# Patient Record
Sex: MALE | Race: OTHER | NOT HISPANIC OR LATINO | URBAN - METROPOLITAN AREA
[De-identification: names, ages, dates, MRNs, and addresses within clinical notes are randomized per-mention and may not be internally consistent; named-entity substitution may affect disease eponyms.]

---

## 2017-02-03 RX ORDER — PANTOPRAZOLE SODIUM 20 MG/1
0 TABLET, DELAYED RELEASE ORAL
Qty: 14 | Refills: 0 | COMMUNITY
Start: 2017-02-03

## 2017-02-03 RX ORDER — PANTOPRAZOLE SODIUM 20 MG/1
1 TABLET, DELAYED RELEASE ORAL
Qty: 14 | Refills: 0 | DISCHARGE
Start: 2017-02-03

## 2017-02-14 RX ORDER — RANOLAZINE 500 MG/1
1 TABLET, FILM COATED, EXTENDED RELEASE ORAL
Qty: 0 | Refills: 0 | COMMUNITY
Start: 2017-02-14 | End: 2017-02-21

## 2017-02-14 RX ORDER — RANOLAZINE 500 MG/1
1 TABLET, FILM COATED, EXTENDED RELEASE ORAL
Qty: 0 | Refills: 0 | DISCHARGE
Start: 2017-02-14

## 2017-02-16 ENCOUNTER — INPATIENT (INPATIENT)
Facility: HOSPITAL | Age: 47
LOS: 1 days | Discharge: HOME | End: 2017-02-18
Attending: INTERNAL MEDICINE | Admitting: INTERNAL MEDICINE
Payer: COMMERCIAL

## 2017-02-16 VITALS
HEART RATE: 87 BPM | RESPIRATION RATE: 20 BRPM | OXYGEN SATURATION: 98 % | SYSTOLIC BLOOD PRESSURE: 128 MMHG | TEMPERATURE: 99 F | DIASTOLIC BLOOD PRESSURE: 86 MMHG

## 2017-02-16 DIAGNOSIS — E78.5 HYPERLIPIDEMIA, UNSPECIFIED: ICD-10-CM

## 2017-02-16 DIAGNOSIS — R07.9 CHEST PAIN, UNSPECIFIED: ICD-10-CM

## 2017-02-16 DIAGNOSIS — E03.9 HYPOTHYROIDISM, UNSPECIFIED: ICD-10-CM

## 2017-02-16 DIAGNOSIS — Z29.9 ENCOUNTER FOR PROPHYLACTIC MEASURES, UNSPECIFIED: ICD-10-CM

## 2017-02-16 DIAGNOSIS — I24.9 ACUTE ISCHEMIC HEART DISEASE, UNSPECIFIED: ICD-10-CM

## 2017-02-16 DIAGNOSIS — I10 ESSENTIAL (PRIMARY) HYPERTENSION: ICD-10-CM

## 2017-02-16 LAB
ALBUMIN SERPL ELPH-MCNC: 4.2 G/DL — SIGNIFICANT CHANGE UP (ref 3.3–5)
ALP SERPL-CCNC: 64 U/L — SIGNIFICANT CHANGE UP (ref 40–120)
ALT FLD-CCNC: 25 U/L — SIGNIFICANT CHANGE UP (ref 4–41)
APTT BLD: 32.9 SEC — SIGNIFICANT CHANGE UP (ref 27.5–37.4)
AST SERPL-CCNC: 20 U/L — SIGNIFICANT CHANGE UP (ref 4–40)
BASOPHILS # BLD AUTO: 0.08 K/UL — SIGNIFICANT CHANGE UP (ref 0–0.2)
BASOPHILS NFR BLD AUTO: 0.7 % — SIGNIFICANT CHANGE UP (ref 0–2)
BILIRUB SERPL-MCNC: 0.5 MG/DL — SIGNIFICANT CHANGE UP (ref 0.2–1.2)
BUN SERPL-MCNC: 13 MG/DL — SIGNIFICANT CHANGE UP (ref 7–23)
CALCIUM SERPL-MCNC: 9.2 MG/DL — SIGNIFICANT CHANGE UP (ref 8.4–10.5)
CHLORIDE SERPL-SCNC: 100 MMOL/L — SIGNIFICANT CHANGE UP (ref 98–107)
CK MB BLD-MCNC: 1.4 — SIGNIFICANT CHANGE UP (ref 0–2.5)
CK MB BLD-MCNC: 1.6 — SIGNIFICANT CHANGE UP (ref 0–2.5)
CK MB BLD-MCNC: 2.41 NG/ML — SIGNIFICANT CHANGE UP (ref 1–6.6)
CK MB BLD-MCNC: 2.58 NG/ML — SIGNIFICANT CHANGE UP (ref 1–6.6)
CK SERPL-CCNC: 162 U/L — SIGNIFICANT CHANGE UP (ref 30–200)
CK SERPL-CCNC: 174 U/L — SIGNIFICANT CHANGE UP (ref 30–200)
CO2 SERPL-SCNC: 27 MMOL/L — SIGNIFICANT CHANGE UP (ref 22–31)
CREAT SERPL-MCNC: 0.97 MG/DL — SIGNIFICANT CHANGE UP (ref 0.5–1.3)
EOSINOPHIL # BLD AUTO: 0.99 K/UL — HIGH (ref 0–0.5)
EOSINOPHIL NFR BLD AUTO: 8.5 % — HIGH (ref 0–6)
GLUCOSE SERPL-MCNC: 108 MG/DL — HIGH (ref 70–99)
HCT VFR BLD CALC: 42.3 % — SIGNIFICANT CHANGE UP (ref 39–50)
HGB BLD-MCNC: 14.1 G/DL — SIGNIFICANT CHANGE UP (ref 13–17)
IMM GRANULOCYTES NFR BLD AUTO: 0.3 % — SIGNIFICANT CHANGE UP (ref 0–1.5)
INR BLD: 1.01 — SIGNIFICANT CHANGE UP (ref 0.87–1.18)
LYMPHOCYTES # BLD AUTO: 38.6 % — SIGNIFICANT CHANGE UP (ref 13–44)
LYMPHOCYTES # BLD AUTO: 4.5 K/UL — HIGH (ref 1–3.3)
MCHC RBC-ENTMCNC: 29 PG — SIGNIFICANT CHANGE UP (ref 27–34)
MCHC RBC-ENTMCNC: 33.3 % — SIGNIFICANT CHANGE UP (ref 32–36)
MCV RBC AUTO: 86.9 FL — SIGNIFICANT CHANGE UP (ref 80–100)
MONOCYTES # BLD AUTO: 0.74 K/UL — SIGNIFICANT CHANGE UP (ref 0–0.9)
MONOCYTES NFR BLD AUTO: 6.4 % — SIGNIFICANT CHANGE UP (ref 2–14)
NEUTROPHILS # BLD AUTO: 5.3 K/UL — SIGNIFICANT CHANGE UP (ref 1.8–7.4)
NEUTROPHILS NFR BLD AUTO: 45.5 % — SIGNIFICANT CHANGE UP (ref 43–77)
PLATELET # BLD AUTO: 283 K/UL — SIGNIFICANT CHANGE UP (ref 150–400)
PMV BLD: 10.5 FL — SIGNIFICANT CHANGE UP (ref 7–13)
POTASSIUM SERPL-MCNC: 4.2 MMOL/L — SIGNIFICANT CHANGE UP (ref 3.5–5.3)
POTASSIUM SERPL-SCNC: 4.2 MMOL/L — SIGNIFICANT CHANGE UP (ref 3.5–5.3)
PROT SERPL-MCNC: 7.3 G/DL — SIGNIFICANT CHANGE UP (ref 6–8.3)
PROTHROM AB SERPL-ACNC: 11.5 SEC — SIGNIFICANT CHANGE UP (ref 10–13.1)
RBC # BLD: 4.87 M/UL — SIGNIFICANT CHANGE UP (ref 4.2–5.8)
RBC # FLD: 12.8 % — SIGNIFICANT CHANGE UP (ref 10.3–14.5)
SODIUM SERPL-SCNC: 140 MMOL/L — SIGNIFICANT CHANGE UP (ref 135–145)
TROPONIN T SERPL-MCNC: < 0.06 NG/ML — SIGNIFICANT CHANGE UP (ref 0–0.06)
TROPONIN T SERPL-MCNC: < 0.06 NG/ML — SIGNIFICANT CHANGE UP (ref 0–0.06)
WBC # BLD: 11.65 K/UL — HIGH (ref 3.8–10.5)
WBC # FLD AUTO: 11.65 K/UL — HIGH (ref 3.8–10.5)

## 2017-02-16 PROCEDURE — 71020: CPT | Mod: 26

## 2017-02-16 RX ORDER — ATORVASTATIN CALCIUM 80 MG/1
10 TABLET, FILM COATED ORAL AT BEDTIME
Qty: 0 | Refills: 0 | Status: DISCONTINUED | OUTPATIENT
Start: 2017-02-16 | End: 2017-02-18

## 2017-02-16 RX ORDER — SODIUM CHLORIDE 9 MG/ML
3 INJECTION INTRAMUSCULAR; INTRAVENOUS; SUBCUTANEOUS EVERY 8 HOURS
Qty: 0 | Refills: 0 | Status: DISCONTINUED | OUTPATIENT
Start: 2017-02-16 | End: 2017-02-18

## 2017-02-16 RX ORDER — ASPIRIN/CALCIUM CARB/MAGNESIUM 324 MG
325 TABLET ORAL ONCE
Qty: 0 | Refills: 0 | Status: COMPLETED | OUTPATIENT
Start: 2017-02-16 | End: 2017-02-16

## 2017-02-16 RX ORDER — TICAGRELOR 90 MG/1
180 TABLET ORAL ONCE
Qty: 0 | Refills: 0 | Status: COMPLETED | OUTPATIENT
Start: 2017-02-16 | End: 2017-02-16

## 2017-02-16 RX ORDER — RANOLAZINE 500 MG/1
500 TABLET, FILM COATED, EXTENDED RELEASE ORAL
Qty: 0 | Refills: 0 | Status: DISCONTINUED | OUTPATIENT
Start: 2017-02-16 | End: 2017-02-18

## 2017-02-16 RX ORDER — PANTOPRAZOLE SODIUM 20 MG/1
40 TABLET, DELAYED RELEASE ORAL
Qty: 0 | Refills: 0 | Status: DISCONTINUED | OUTPATIENT
Start: 2017-02-16 | End: 2017-02-18

## 2017-02-16 RX ORDER — ASPIRIN/CALCIUM CARB/MAGNESIUM 324 MG
81 TABLET ORAL DAILY
Qty: 0 | Refills: 0 | Status: DISCONTINUED | OUTPATIENT
Start: 2017-02-17 | End: 2017-02-18

## 2017-02-16 RX ORDER — LOSARTAN POTASSIUM 100 MG/1
50 TABLET, FILM COATED ORAL DAILY
Qty: 0 | Refills: 0 | Status: DISCONTINUED | OUTPATIENT
Start: 2017-02-16 | End: 2017-02-18

## 2017-02-16 RX ORDER — LEVOTHYROXINE SODIUM 125 MCG
25 TABLET ORAL DAILY
Qty: 0 | Refills: 0 | Status: DISCONTINUED | OUTPATIENT
Start: 2017-02-16 | End: 2017-02-18

## 2017-02-16 RX ORDER — METOPROLOL TARTRATE 50 MG
50 TABLET ORAL DAILY
Qty: 0 | Refills: 0 | Status: DISCONTINUED | OUTPATIENT
Start: 2017-02-16 | End: 2017-02-18

## 2017-02-16 RX ORDER — HEPARIN SODIUM 5000 [USP'U]/ML
5000 INJECTION INTRAVENOUS; SUBCUTANEOUS EVERY 8 HOURS
Qty: 0 | Refills: 0 | Status: DISCONTINUED | OUTPATIENT
Start: 2017-02-16 | End: 2017-02-18

## 2017-02-16 RX ADMIN — Medication 325 MILLIGRAM(S): at 19:20

## 2017-02-16 RX ADMIN — TICAGRELOR 180 MILLIGRAM(S): 90 TABLET ORAL at 19:20

## 2017-02-16 RX ADMIN — HEPARIN SODIUM 5000 UNIT(S): 5000 INJECTION INTRAVENOUS; SUBCUTANEOUS at 22:34

## 2017-02-16 RX ADMIN — RANOLAZINE 500 MILLIGRAM(S): 500 TABLET, FILM COATED, EXTENDED RELEASE ORAL at 22:34

## 2017-02-16 RX ADMIN — PANTOPRAZOLE SODIUM 40 MILLIGRAM(S): 20 TABLET, DELAYED RELEASE ORAL at 22:34

## 2017-02-16 RX ADMIN — SODIUM CHLORIDE 3 MILLILITER(S): 9 INJECTION INTRAMUSCULAR; INTRAVENOUS; SUBCUTANEOUS at 22:34

## 2017-02-16 RX ADMIN — LOSARTAN POTASSIUM 50 MILLIGRAM(S): 100 TABLET, FILM COATED ORAL at 22:34

## 2017-02-16 RX ADMIN — Medication 25 MICROGRAM(S): at 22:34

## 2017-02-16 RX ADMIN — ATORVASTATIN CALCIUM 10 MILLIGRAM(S): 80 TABLET, FILM COATED ORAL at 22:34

## 2017-02-16 NOTE — H&P ADULT. - ATTENDING COMMENTS
pt seen and examined at bedside. Agree with assessment and plan  Admitted with chest pain concerning for UA  Will plan for [facility]  Brilinta 180 mg x1 and ASA

## 2017-02-16 NOTE — ED ADULT NURSE NOTE - ED STAT RN HANDOFF DETAILS
Rpt to HUBERT Kaba - pt aaox4, ambulatory/self care, in NAD, NSR on CM, x2 Salome negative, NPO after midnight for anticipated Card Cath in AM.

## 2017-02-16 NOTE — ED PROVIDER NOTE - MEDICAL DECISION MAKING DETAILS
47 y/o M with h/o hypertension presents with episode of left sided chest pain last night, currently asymptomatic. EKG with no ischemic changes. Heart score 2-low risk. 2 sets of Salome and will follow up with cardiologist tomorrow for possible cath 45 y/o M with h/o hypertension presents with episode of left sided chest pain last night, currently asymptomatic. EKG with no ischemic changes. Heart score 2-low risk. 2 sets of Salome and will follow up with cardiologist tomorrow for possible cath  Attending Note: 45 y/o male presents w/ c/o c/p. S/P stress x 1 day, basic labs, cardiac enzymes, cxr, ekg. Discussed with cardiology. If neg may be d/c and if asymptomatic, pt to see MD in AM for possible cath.

## 2017-02-16 NOTE — H&P ADULT. - HISTORY OF PRESENT ILLNESS
Hamida speaking and the history provided by the daughter at the pt's request.    46M with a history of HTN, Hypothyroid, GERD, Dyslipidemia, experienced exertional, L sided chest squeezing sensation that radiates to his L side back. The chest pain is intermittent with episodes lasting for 10 minutes, subsiding on their own and occurring 3 to 4x's a day. Positive SOB. No cough, nausea, vomit, diaphoresis, chills.    In the Ed, the pt had Ce negative x 2, EKG NSR.

## 2017-02-16 NOTE — H&P ADULT. - NEGATIVE NEUROLOGICAL SYMPTOMS
no tremors/no difficulty walking/no loss of sensation/no syncope/no vertigo/no generalized seizures/no paresthesias/no focal seizures/no weakness

## 2017-02-16 NOTE — H&P ADULT. - RS GEN PE MLT RESP DETAILS PC
clear to auscultation bilaterally/good air movement/breath sounds equal/respirations non-labored/airway patent

## 2017-02-16 NOTE — ED PROVIDER NOTE - PROGRESS NOTE DETAILS
Patient's family at bedside and state that patient does not want to be further managed by Dr. Mello and would like to be admitted to hospital for further evaluation. Spoke with Dr. Carbone who is tele doc of the day. He was notified that patient was previously taken care of by Dr. Mello but family wishes to have second opinion. Patient will likely have cath tomorrow.

## 2017-02-16 NOTE — ED PROVIDER NOTE - OBJECTIVE STATEMENT
47 y/o M with h/o HTN, hypothyroid presents with complaint of episode of left sided chest pain at 3 AM last night while patient was working. Patient reports recurrent episodes of similar chest pain for which he is being evaluated by Dr. Mello. Had stress and echo done 2 days ago. Denies any SOB, SEGURA, orthopnea, LE swelling. Currently patient is not having any active chest pain. No family history of MI.

## 2017-02-16 NOTE — ED ADULT NURSE NOTE - OBJECTIVE STATEMENT
Pt a&ox3, aminata speaking, c/o intermittent cp starting around 400am this morning, nonreproducible. Pt denies sob breathing even and unlabored resp. NSR on monitor, denies any present cp/discomfort, denies headache/dizziness. Abdomen is soft, non-tender, non-distended. Skin is cool dry and intact. IV lock placed, labs drawn and sent as ordered. Will continue to monitor.

## 2017-02-16 NOTE — ED PROVIDER NOTE - PHYSICAL EXAMINATION
Attending Note: 45 y/o male left sided c/p x 0400. PMH HTN, hypothyroid. Reports recurrent episodes of similar c/p being evaluated by Dr. Mello. Had stress and echo x 2 days ago. Denies SOB, SEGURA, PND, diaphoresis, N/V, orthopnea, LE swelling, recent travel, trauma. Patient is asymptomatic at present. No family history of MI.

## 2017-02-17 LAB
BUN SERPL-MCNC: 10 MG/DL — SIGNIFICANT CHANGE UP (ref 7–23)
CALCIUM SERPL-MCNC: 9.1 MG/DL — SIGNIFICANT CHANGE UP (ref 8.4–10.5)
CHLORIDE SERPL-SCNC: 105 MMOL/L — SIGNIFICANT CHANGE UP (ref 98–107)
CO2 SERPL-SCNC: 22 MMOL/L — SIGNIFICANT CHANGE UP (ref 22–31)
CREAT SERPL-MCNC: 0.99 MG/DL — SIGNIFICANT CHANGE UP (ref 0.5–1.3)
GLUCOSE SERPL-MCNC: 107 MG/DL — HIGH (ref 70–99)
HCT VFR BLD CALC: 42.4 % — SIGNIFICANT CHANGE UP (ref 39–50)
HGB BLD-MCNC: 13.9 G/DL — SIGNIFICANT CHANGE UP (ref 13–17)
MAGNESIUM SERPL-MCNC: 2.2 MG/DL — SIGNIFICANT CHANGE UP (ref 1.6–2.6)
MCHC RBC-ENTMCNC: 28.3 PG — SIGNIFICANT CHANGE UP (ref 27–34)
MCHC RBC-ENTMCNC: 32.8 % — SIGNIFICANT CHANGE UP (ref 32–36)
MCV RBC AUTO: 86.4 FL — SIGNIFICANT CHANGE UP (ref 80–100)
PHOSPHATE SERPL-MCNC: 4 MG/DL — SIGNIFICANT CHANGE UP (ref 2.5–4.5)
PLATELET # BLD AUTO: 289 K/UL — SIGNIFICANT CHANGE UP (ref 150–400)
PMV BLD: 10.2 FL — SIGNIFICANT CHANGE UP (ref 7–13)
POTASSIUM SERPL-MCNC: 4.1 MMOL/L — SIGNIFICANT CHANGE UP (ref 3.5–5.3)
POTASSIUM SERPL-SCNC: 4.1 MMOL/L — SIGNIFICANT CHANGE UP (ref 3.5–5.3)
RBC # BLD: 4.91 M/UL — SIGNIFICANT CHANGE UP (ref 4.2–5.8)
RBC # FLD: 13.1 % — SIGNIFICANT CHANGE UP (ref 10.3–14.5)
SODIUM SERPL-SCNC: 142 MMOL/L — SIGNIFICANT CHANGE UP (ref 135–145)
TSH SERPL-MCNC: 6.03 UIU/ML — HIGH (ref 0.27–4.2)
WBC # BLD: 13.2 K/UL — HIGH (ref 3.8–10.5)
WBC # FLD AUTO: 13.2 K/UL — HIGH (ref 3.8–10.5)

## 2017-02-17 PROCEDURE — 93458 L HRT ARTERY/VENTRICLE ANGIO: CPT | Mod: 26

## 2017-02-17 RX ORDER — SODIUM CHLORIDE 9 MG/ML
1000 INJECTION INTRAMUSCULAR; INTRAVENOUS; SUBCUTANEOUS
Qty: 0 | Refills: 0 | Status: DISCONTINUED | OUTPATIENT
Start: 2017-02-17 | End: 2017-02-18

## 2017-02-17 RX ADMIN — Medication 81 MILLIGRAM(S): at 12:23

## 2017-02-17 RX ADMIN — SODIUM CHLORIDE 150 MILLILITER(S): 9 INJECTION INTRAMUSCULAR; INTRAVENOUS; SUBCUTANEOUS at 20:52

## 2017-02-17 RX ADMIN — LOSARTAN POTASSIUM 50 MILLIGRAM(S): 100 TABLET, FILM COATED ORAL at 05:47

## 2017-02-17 RX ADMIN — SODIUM CHLORIDE 3 MILLILITER(S): 9 INJECTION INTRAMUSCULAR; INTRAVENOUS; SUBCUTANEOUS at 14:48

## 2017-02-17 RX ADMIN — ATORVASTATIN CALCIUM 10 MILLIGRAM(S): 80 TABLET, FILM COATED ORAL at 22:03

## 2017-02-17 RX ADMIN — HEPARIN SODIUM 5000 UNIT(S): 5000 INJECTION INTRAVENOUS; SUBCUTANEOUS at 22:03

## 2017-02-17 RX ADMIN — SODIUM CHLORIDE 3 MILLILITER(S): 9 INJECTION INTRAMUSCULAR; INTRAVENOUS; SUBCUTANEOUS at 21:52

## 2017-02-17 RX ADMIN — RANOLAZINE 500 MILLIGRAM(S): 500 TABLET, FILM COATED, EXTENDED RELEASE ORAL at 05:47

## 2017-02-17 RX ADMIN — SODIUM CHLORIDE 3 MILLILITER(S): 9 INJECTION INTRAMUSCULAR; INTRAVENOUS; SUBCUTANEOUS at 05:47

## 2017-02-17 RX ADMIN — PANTOPRAZOLE SODIUM 40 MILLIGRAM(S): 20 TABLET, DELAYED RELEASE ORAL at 05:47

## 2017-02-17 RX ADMIN — Medication 25 MICROGRAM(S): at 05:47

## 2017-02-17 RX ADMIN — HEPARIN SODIUM 5000 UNIT(S): 5000 INJECTION INTRAVENOUS; SUBCUTANEOUS at 05:47

## 2017-02-17 RX ADMIN — Medication 50 MILLIGRAM(S): at 05:47

## 2017-02-18 VITALS — SYSTOLIC BLOOD PRESSURE: 106 MMHG | HEART RATE: 75 BPM | DIASTOLIC BLOOD PRESSURE: 65 MMHG

## 2017-02-18 LAB
BUN SERPL-MCNC: 10 MG/DL — SIGNIFICANT CHANGE UP (ref 7–23)
CALCIUM SERPL-MCNC: 8.6 MG/DL — SIGNIFICANT CHANGE UP (ref 8.4–10.5)
CHLORIDE SERPL-SCNC: 105 MMOL/L — SIGNIFICANT CHANGE UP (ref 98–107)
CO2 SERPL-SCNC: 22 MMOL/L — SIGNIFICANT CHANGE UP (ref 22–31)
CREAT SERPL-MCNC: 0.95 MG/DL — SIGNIFICANT CHANGE UP (ref 0.5–1.3)
GLUCOSE SERPL-MCNC: 91 MG/DL — SIGNIFICANT CHANGE UP (ref 70–99)
HCT VFR BLD CALC: 41.2 % — SIGNIFICANT CHANGE UP (ref 39–50)
HGB BLD-MCNC: 13.5 G/DL — SIGNIFICANT CHANGE UP (ref 13–17)
MAGNESIUM SERPL-MCNC: 2.1 MG/DL — SIGNIFICANT CHANGE UP (ref 1.6–2.6)
MCHC RBC-ENTMCNC: 28.5 PG — SIGNIFICANT CHANGE UP (ref 27–34)
MCHC RBC-ENTMCNC: 32.8 % — SIGNIFICANT CHANGE UP (ref 32–36)
MCV RBC AUTO: 86.9 FL — SIGNIFICANT CHANGE UP (ref 80–100)
PLATELET # BLD AUTO: 270 K/UL — SIGNIFICANT CHANGE UP (ref 150–400)
PMV BLD: 10.8 FL — SIGNIFICANT CHANGE UP (ref 7–13)
POTASSIUM SERPL-MCNC: 4.1 MMOL/L — SIGNIFICANT CHANGE UP (ref 3.5–5.3)
POTASSIUM SERPL-SCNC: 4.1 MMOL/L — SIGNIFICANT CHANGE UP (ref 3.5–5.3)
RBC # BLD: 4.74 M/UL — SIGNIFICANT CHANGE UP (ref 4.2–5.8)
RBC # FLD: 13.1 % — SIGNIFICANT CHANGE UP (ref 10.3–14.5)
SODIUM SERPL-SCNC: 142 MMOL/L — SIGNIFICANT CHANGE UP (ref 135–145)
WBC # BLD: 13.17 K/UL — HIGH (ref 3.8–10.5)
WBC # FLD AUTO: 13.17 K/UL — HIGH (ref 3.8–10.5)

## 2017-02-18 RX ADMIN — Medication 50 MILLIGRAM(S): at 05:54

## 2017-02-18 RX ADMIN — Medication 81 MILLIGRAM(S): at 12:02

## 2017-02-18 RX ADMIN — SODIUM CHLORIDE 3 MILLILITER(S): 9 INJECTION INTRAMUSCULAR; INTRAVENOUS; SUBCUTANEOUS at 05:21

## 2017-02-18 RX ADMIN — LOSARTAN POTASSIUM 50 MILLIGRAM(S): 100 TABLET, FILM COATED ORAL at 05:54

## 2017-02-18 RX ADMIN — Medication 25 MICROGRAM(S): at 05:20

## 2017-02-18 RX ADMIN — PANTOPRAZOLE SODIUM 40 MILLIGRAM(S): 20 TABLET, DELAYED RELEASE ORAL at 05:21

## 2017-02-18 RX ADMIN — RANOLAZINE 500 MILLIGRAM(S): 500 TABLET, FILM COATED, EXTENDED RELEASE ORAL at 05:21

## 2017-02-18 RX ADMIN — HEPARIN SODIUM 5000 UNIT(S): 5000 INJECTION INTRAVENOUS; SUBCUTANEOUS at 05:20

## 2017-02-18 NOTE — DISCHARGE NOTE ADULT - NS AS ACTIVITY OBS
No heavy lifting greater than 5-10 pounds with right wrist x one week. No strenuous activity x 3 weeks. Do not drive or operate machinery/Walking-Indoors allowed/Walking-Outdoors allowed/Showering allowed/No heavy lifting greater than 5-10 pounds with right wrist x one week. No strenuous activity x 3 weeks./No Heavy lifting/straining

## 2017-02-18 NOTE — DISCHARGE NOTE ADULT - MEDICATION SUMMARY - MEDICATIONS TO TAKE
I will START or STAY ON the medications listed below when I get home from the hospital:    aspirin 81 mg oral delayed release tablet  -- 1 tab(s) by mouth once a day  -- Indication: For Non-obstructive CAD (coronary artery disease)    losartan 50 mg oral tablet  -- 1 tab(s) by mouth once a day  -- Indication: For HTN (hypertension)    Ranexa 500 mg oral tablet, extended release  -- 1 tab(s) by mouth 2 times a day  -- Indication: For Chest pain    pravastatin 20 mg oral tablet  -- 1 tab(s) by mouth once a day  -- Indication: For HLD (hyperlipidemia)    metoprolol succinate 50 mg oral tablet, extended release  -- 1 tab(s) by mouth once a day  -- Indication: For HTN (hypertension)    dicyclomine 20 mg oral tablet  -- 1 tab(s) by mouth 3 times a day, As Needed  -- Indication: For Bowel    pantoprazole 20 mg oral delayed release tablet  -- 1 tab(s) by mouth once a day, for 14 days  -- Indication: For GERD    levothyroxine 25 mcg (0.025 mg) oral tablet  -- 1 tab(s) by mouth once a day  -- Indication: For Hypothyroidism, unspecified type

## 2017-02-18 NOTE — DISCHARGE NOTE ADULT - PATIENT PORTAL LINK FT
“You can access the [employer] Patient Portal, offered by [facility], by registering with the following website: [email or web address]”

## 2017-02-18 NOTE — DISCHARGE NOTE ADULT - PLAN OF CARE
Remain chest pain free Follow up with your cardiologist  You had a cardiac cath therefore no heavy lifting greater than 5-10 pounds with right wrist x one week. No strenuous activity x 3 weeks. Monitor site of procedure and notify your doctor for any redness, swelling, discharge. Maintain normal blood pressure to prevent heart attack, stroke and renal failure Home blood pressure monitoring  Low sodium diet  Follow up with primary care physician   Continue blood pressure medications Maintain normal cholesterol levels to prevent stroke and heart attack Low fat diet  Continue current medications  Follow up with primary care physician and cardiologist To maintain a normal TSH Level Continue to take your thyroid medication as prescribed  Follow up with your primary care provider for routine thyroid level checks Maintain normal bowel function Continue your bentyl as instructed by your doctor Remain chest pain free. Prevent coronary artery disease. Follow up with cardiologist within one week of discharge. Call for appointment. Return to ED for any concerning symptoms. Continue medications as prescribed. Low salt, low fat, low cholesterol diet. You had a cardiac cath therefore no heavy lifting greater than 5-10 pounds with right wrist x one week. No strenuous activity x 3 weeks. Monitor site of procedure and notify your doctor for any redness, swelling, discharge. Maintain adequate control of your blood pressure. Goal BP < 130/80. Continue low sodium diet. Follow up with PCP and/or cardiologist for ongoing medical management of your hypertension. Continue medications as prescribed. Low salt diet. Maintain adequate control of your cholesterol levels. Goal LDL < 70. Follow up with PCP for ongoing medical management. Continue medications as prescribed. Low cholesterol diet. To maintain a normal TSH Level. Continue to take your thyroid medication as prescribed. Follow up with your primary care provider for routine thyroid level checks.

## 2017-02-18 NOTE — DISCHARGE NOTE ADULT - SECONDARY DIAGNOSIS.
Essential hypertension Hyperlipidemia, unspecified hyperlipidemia type Hypothyroidism, unspecified type IBS (irritable bowel syndrome) HTN (hypertension) HLD (hyperlipidemia) Hypothyroid

## 2017-02-18 NOTE — DISCHARGE NOTE ADULT - CARE PROVIDER_API CALL
Daljit Duncan), Family Medicine  [address] [place] [address]  Phone: [phone]  Fax: [phone] Daljit Duncan), Family Medicine  [address] [place] [address]  Phone: [phone]  Fax: [phone]    Nixon Carbone (), Cardiology; Internal Medicine  [address] [place] [address]  Phone: [phone]  Fax: [phone]

## 2017-02-18 NOTE — DISCHARGE NOTE ADULT - CARE PLAN
Principal Discharge DX:	Chest pain, unspecified type  Goal:	Remain chest pain free  Instructions for follow-up, activity and diet:	Follow up with your cardiologist  You had a cardiac cath therefore no heavy lifting greater than 5-10 pounds with right wrist x one week. No strenuous activity x 3 weeks. Monitor site of procedure and notify your doctor for any redness, swelling, discharge.  Secondary Diagnosis:	Essential hypertension  Goal:	Maintain normal blood pressure to prevent heart attack, stroke and renal failure  Instructions for follow-up, activity and diet:	Home blood pressure monitoring  Low sodium diet  Follow up with primary care physician   Continue blood pressure medications  Secondary Diagnosis:	Hyperlipidemia, unspecified hyperlipidemia type  Goal:	Maintain normal cholesterol levels to prevent stroke and heart attack  Instructions for follow-up, activity and diet:	Low fat diet  Continue current medications  Follow up with primary care physician and cardiologist  Secondary Diagnosis:	Hypothyroidism, unspecified type  Goal:	To maintain a normal TSH Level  Instructions for follow-up, activity and diet:	Continue to take your thyroid medication as prescribed  Follow up with your primary care provider for routine thyroid level checks  Secondary Diagnosis:	IBS (irritable bowel syndrome)  Goal:	Maintain normal bowel function  Instructions for follow-up, activity and diet:	Continue your bentyl as instructed by your doctor Principal Discharge DX:	Chest pain, unspecified type  Goal:	Remain chest pain free. Prevent coronary artery disease.  Instructions for follow-up, activity and diet:	Follow up with cardiologist within one week of discharge. Call for appointment. Return to ED for any concerning symptoms. Continue medications as prescribed. Low salt, low fat, low cholesterol diet. You had a cardiac cath therefore no heavy lifting greater than 5-10 pounds with right wrist x one week. No strenuous activity x 3 weeks. Monitor site of procedure and notify your doctor for any redness, swelling, discharge.  Secondary Diagnosis:	HTN (hypertension)  Goal:	Maintain adequate control of your blood pressure. Goal BP < 130/80. Continue low sodium diet.  Instructions for follow-up, activity and diet:	Follow up with PCP and/or cardiologist for ongoing medical management of your hypertension. Continue medications as prescribed. Low salt diet.  Secondary Diagnosis:	HLD (hyperlipidemia)  Goal:	Maintain adequate control of your cholesterol levels. Goal LDL < 70.  Instructions for follow-up, activity and diet:	Follow up with PCP for ongoing medical management. Continue medications as prescribed. Low cholesterol diet.  Secondary Diagnosis:	Hypothyroid  Goal:	To maintain a normal TSH Level.  Instructions for follow-up, activity and diet:	Continue to take your thyroid medication as prescribed. Follow up with your primary care provider for routine thyroid level checks.

## 2017-02-18 NOTE — DISCHARGE NOTE ADULT - HOSPITAL COURSE
45 y/o male with a hx of HTN, HLD, hypothyroidism, with c/o chest pain x 3 days admitted to telemetry to r/o ACS. Pt had 2 negative troponins and CXR that was unremarkable. Pt had a cardiac cath inpatient that revealed LM spasm, diffuse luminal irregularities, EF 65% and LVEDP 14. Pt remained chest pain free and hemodynamically stable throughout admission. 47 y/o male with a PMHx of HTN, HLD, and hypothyroidism presented to ED complaining of chest pain. Upon arrival to ED, EKG revealed NSR at 87 bpm. Pt ruled out for ACS with two sets of negative cardiac enzymes. CXR revealed, "Clear lungs. No pleural effusions or pneumothorax. Cardiac and mediastinal silhouettes within normal limits. Trachea midline. Unremarkable osseous structures." Pt was admitted to telemetry. Pt had a cardiac cath inpatient that revealed LM spasm, diffuse luminal irregularities with EF 65% and LVEDP 14. Pt remained chest pain free and hemodynamically stable throughout admission. Case discussed with Dr. Carbone on 2/18. Pt now stable for discharge home.

## 2018-02-22 ENCOUNTER — EMERGENCY (EMERGENCY)
Facility: HOSPITAL | Age: 48
LOS: 1 days | Discharge: HOME | End: 2018-02-22
Attending: EMERGENCY MEDICINE | Admitting: EMERGENCY MEDICINE
Payer: COMMERCIAL

## 2018-02-22 VITALS
RESPIRATION RATE: 16 BRPM | DIASTOLIC BLOOD PRESSURE: 69 MMHG | TEMPERATURE: 97 F | HEART RATE: 64 BPM | SYSTOLIC BLOOD PRESSURE: 109 MMHG | OXYGEN SATURATION: 99 %

## 2018-02-22 PROBLEM — I10 ESSENTIAL (PRIMARY) HYPERTENSION: Chronic | Status: ACTIVE | Noted: 2017-02-16

## 2018-02-22 PROBLEM — E03.9 HYPOTHYROIDISM, UNSPECIFIED: Chronic | Status: ACTIVE | Noted: 2017-02-16

## 2018-02-22 PROCEDURE — 99283 EMERGENCY DEPT VISIT LOW MDM: CPT | Mod: 25

## 2018-02-22 RX ORDER — DIAZEPAM 5 MG
1 TABLET ORAL
Qty: 6 | Refills: 0
Start: 2018-02-22 | End: 2018-02-23

## 2018-02-22 RX ORDER — LIDOCAINE 4 G/100G
1 CREAM TOPICAL ONCE
Qty: 0 | Refills: 0 | Status: COMPLETED | OUTPATIENT
Start: 2018-02-22 | End: 2018-02-22

## 2018-02-22 RX ORDER — ACETAMINOPHEN 500 MG
650 TABLET ORAL ONCE
Qty: 0 | Refills: 0 | Status: DISCONTINUED | OUTPATIENT
Start: 2018-02-22 | End: 2018-02-22

## 2018-02-22 RX ORDER — KETOROLAC TROMETHAMINE 30 MG/ML
30 SYRINGE (ML) INJECTION ONCE
Qty: 0 | Refills: 0 | Status: DISCONTINUED | OUTPATIENT
Start: 2018-02-22 | End: 2018-02-22

## 2018-02-22 RX ADMIN — Medication 650 MILLIGRAM(S): at 07:26

## 2018-02-22 RX ADMIN — LIDOCAINE 1 PATCH: 4 CREAM TOPICAL at 07:26

## 2018-02-22 RX ADMIN — Medication 30 MILLIGRAM(S): at 07:26

## 2018-02-22 NOTE — ED PROVIDER NOTE - OBJECTIVE STATEMENT
46 yo M PMHx HTN, HLD, hypothyroidism p/w back pain. 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 Pt is a  and started having sudden onset upper back pain while driving last night around midnight. He took 600 mg Ibuprofen with no relief in his pain. He appears uncomfortable and the pain is worsened with movement. He was brought in by his son and is accompanied by his wife at bedside. He denies CP/palpitations, N/V, abd pain, fevers/chills, urinary changes, IVDU. He denies tobacco use, drinks occasional ETOH, denies drug use.

## 2018-02-22 NOTE — ED PROVIDER NOTE - PHYSICAL EXAMINATION
Gen: AAOx3, non-toxic, appears uncomfortable   Head: NCAT  HEENT: EOMI, oral mucosa moist, normal conjunctiva  Lung: CTAB, no respiratory distress, no wheezes/rhonchi/rales B/L, speaking in full sentences  CV: RRR, no murmurs, rubs or gallops  Abd: soft, NTND, no guarding, no CVA tenderness bilaterally  MSK: no visible deformities, no midline thoracic or lumbar tenderness +R paraspinal tenderness of thoracic region  Neuro: No focal sensory or motor deficits, +5/5 lower ext strength with upgoing Babinski bilaterally, ambulates well  Skin: Warm, well perfused, no rash  Psych: normal affect.   ~Bob Miller M.D. Resident

## 2018-02-22 NOTE — ED PROVIDER NOTE - ATTENDING CONTRIBUTION TO CARE
I was physically present for the E/M service provided. I agree with above history, physical, and plan which I have reviewed and edited where appropriate. I was physically present for the key portions of the service provided.    46 yo M PMHx HTN, HLD, hypothyroidism p/w back pain. Pt is a  and started having sudden onset upper back pain while driving last night around midnight. He took 600 mg Ibuprofen with no relief in his pain.  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  no diaphoresis, no n/v, no headache, no cp, no sob, no radiation, no tearing pain, no cough, no trauma, no durg use.  pt states worse with movement without focal weakness or numbness or tingling.      *GEN:   comfortable, in no apparent distress, AOx3  *EYES:   PERRL, extra-occular movements intact  *HEENT:   airway patent, moist mucosal membranes, uvula midline  *CV:   regular rate and rhythm, normal S1/S2, no murmur  *RESP:   clear to auscultation bilaterally, non-labored, speaking in full sentences  *ABD:   soft, non tender, no guarding  *MSK:   right rhomboid musculoskeletal tenderness pinpoint, 5/5 strength, moving all extremity  *SKIN:   dry, intact, no rash  *NEURO:   AOx3, no focal weakness or loss of sensation, gait normal, GCS 15    pt with msk pain.  will give meds and reassess.  have pt f/u with pcp for physical therapy and please use heat packs to area,  take motrin 600mg every 6 hrs as needed, tylenol 650mg every 4 hrs as needed, stay active, no heavy lifting and return if symptoms worses

## 2018-02-22 NOTE — ED ADULT TRIAGE NOTE - CHIEF COMPLAINT QUOTE
Patient c/o right sided back pain since yesterday. Denies any pain to the area. Pain worse with movement.

## 2018-02-22 NOTE — ED PROVIDER NOTE - MEDICAL DECISION MAKING DETAILS
46 yo M PMHx HTN, HLD, hypothyroidism p/w sudden onset back pain with paraspinal tenderness likely musculoskeletal strain, will provide pain control and reevaluate

## 2018-02-22 NOTE — ED PROVIDER NOTE - PLAN OF CARE
You were seen in the Emergency Department for back pain.   1) Advance activity as tolerated.    2) Continue all previously prescribed medications as directed.  your prescription of Valium and take as directed. DO NOT drive or drink alcohol while taking this medication.   3) Follow up with your primary care physician in 24-48 hours.  4) Return to the Emergency Department for worsening or persistent symptoms, and/or ANY NEW OR CONCERNING SYMPTOMS. If you have issues obtaining follow up, please call: [phone] to obtain a doctor or specialist who takes your insurance in your area.

## 2018-02-22 NOTE — ED PROVIDER NOTE - CARE PLAN
Principal Discharge DX:	Back pain  Assessment and plan of treatment:	You were seen in the Emergency Department for back pain.   1) Advance activity as tolerated.    2) Continue all previously prescribed medications as directed.  your prescription of Valium and take as directed. DO NOT drive or drink alcohol while taking this medication.   3) Follow up with your primary care physician in 24-48 hours.  4) Return to the Emergency Department for worsening or persistent symptoms, and/or ANY NEW OR CONCERNING SYMPTOMS. If you have issues obtaining follow up, please call: [phone] to obtain a doctor or specialist who takes your insurance in your area.

## 2018-02-22 NOTE — ED PROVIDER NOTE - NS ED ROS FT
GENERAL: No fever or chills, EYES: no change in vision, HEENT: no trouble swallowing or speaking, CARDIAC: no chest pain, PULMONARY: no cough or SOB, GI: no abdominal pain, no nausea, no vomiting, no diarrhea or constipation, : No changes in urination, SKIN: no rashes, NEURO: no headache,  MSK: upper back pain ~Bob Miller M.D. Resident

## 2018-03-20 NOTE — PATIENT PROFILE ADULT. - PROVIDER NOTIFICATION
RECEIVED PARTIAL CONSULT LETTER, 4 PAGES OF 8.  CALLED DR. MOLINA'S OFFICE AND REQUESTED A COMPLETE COPY OF THE 3-19-18 PROGRESS NOTE.  WAS TOLD THIS WILL BE ADDRESSED WITHIN 24-48 HOURS. Declines

## 2019-06-20 ENCOUNTER — EMERGENCY (EMERGENCY)
Facility: HOSPITAL | Age: 49
LOS: 1 days | Discharge: HOME | End: 2019-06-20
Admitting: EMERGENCY MEDICINE
Payer: MEDICAID

## 2019-06-20 VITALS
DIASTOLIC BLOOD PRESSURE: 84 MMHG | RESPIRATION RATE: 18 BRPM | TEMPERATURE: 98 F | SYSTOLIC BLOOD PRESSURE: 128 MMHG | HEART RATE: 74 BPM | OXYGEN SATURATION: 98 %

## 2019-06-20 PROCEDURE — 99283 EMERGENCY DEPT VISIT LOW MDM: CPT | Mod: 25

## 2019-06-20 RX ADMIN — Medication 100 MILLIGRAM(S): at 03:55

## 2019-06-20 NOTE — ED PROVIDER NOTE - OBJECTIVE STATEMENT
50 Y/O M PMH HTN presents after noting a bump on his head for the past day. Pt denies trauma, fever, chills, nightsweats or any other symptoms or complaints. Pt states he never noticed the area was inflamed before. Hamida interpretation used to speak with patient.

## 2019-06-20 NOTE — ED ADULT TRIAGE NOTE - CHIEF COMPLAINT QUOTE
pt. w/ hx. HTN c/o small hard bump noticed a few hours ago on top of the head. Pt. denies any recent fall , or hit to the head , pt. denies any pain on the bump. He is just concern , and would like an evaluation on the bump. Bump is noted to be small and intact. No signs of trauma. Pt. appears in NAD in triage.

## 2019-06-20 NOTE — ED PROVIDER NOTE - NSFOLLOWUPINSTRUCTIONS_ED_ALL_ED_FT
Take the antibiotic two times per day for 10 days and hold a warm rag up to the injured area. Return to the ER for fever chills nightsweats or any other changes that concern you.  The area may need to be drained, see a dermatologist by calling  or return to the ER in a few days to check the area. Return to the ER for fever chills nightsweats worsening infection or any other changes that concern you. Advance activity as tolerated.  Continue all previously prescribed medications as directed.  Follow up with your primary care physician in 48-72 hours- bring copies of your results.  Return to the ER for worsening or persistent symptoms, and/or ANY NEW OR CONCERNING SYMPTOMS. If you have issues obtaining follow up, please call: [phone] to obtain a doctor or specialist who takes your insurance in your area.  You may call [phone] to make an appointment with the internal medicine clinic.

## 2019-06-20 NOTE — ED PROVIDER NOTE - CLINICAL SUMMARY MEDICAL DECISION MAKING FREE TEXT BOX
Pt has small sebaceous cyst not requiring I and D at this time. Pt well appearing, no fluctuance, no cellulitis/streaking. ABX ordered. Pt advised area may later require I and D.

## 2020-10-07 PROBLEM — Z00.00 ENCOUNTER FOR PREVENTIVE HEALTH EXAMINATION: Status: ACTIVE | Noted: 2020-10-07

## 2020-10-15 ENCOUNTER — APPOINTMENT (OUTPATIENT)
Dept: SURGERY | Facility: CLINIC | Age: 50
End: 2020-10-15
Payer: MEDICAID

## 2020-10-15 VITALS
WEIGHT: 180 LBS | HEIGHT: 63 IN | HEART RATE: 69 BPM | SYSTOLIC BLOOD PRESSURE: 130 MMHG | DIASTOLIC BLOOD PRESSURE: 80 MMHG | BODY MASS INDEX: 31.89 KG/M2

## 2020-10-15 DIAGNOSIS — D17.21 BENIGN LIPOMATOUS NEOPLASM OF SKIN AND SUBCUTANEOUS TISSUE OF RIGHT ARM: ICD-10-CM

## 2020-10-15 DIAGNOSIS — Z86.39 PERSONAL HISTORY OF OTHER ENDOCRINE, NUTRITIONAL AND METABOLIC DISEASE: ICD-10-CM

## 2020-10-15 DIAGNOSIS — Z86.79 PERSONAL HISTORY OF OTHER DISEASES OF THE CIRCULATORY SYSTEM: ICD-10-CM

## 2020-10-15 DIAGNOSIS — D17.9 BENIGN LIPOMATOUS NEOPLASM, UNSPECIFIED: ICD-10-CM

## 2020-10-15 PROCEDURE — 99203 OFFICE O/P NEW LOW 30 MIN: CPT

## 2020-10-15 RX ORDER — ASPIRIN 81 MG
81 TABLET, DELAYED RELEASE (ENTERIC COATED) ORAL
Refills: 0 | Status: ACTIVE | COMMUNITY

## 2020-10-15 RX ORDER — AMLODIPINE BESYLATE 5 MG/1
TABLET ORAL
Refills: 0 | Status: ACTIVE | COMMUNITY

## 2020-10-21 PROBLEM — Z86.39 HISTORY OF HYPOTHYROIDISM: Status: RESOLVED | Noted: 2020-10-21 | Resolved: 2020-10-21

## 2020-10-21 PROBLEM — Z86.79 HISTORY OF HYPERTENSION: Status: RESOLVED | Noted: 2020-10-21 | Resolved: 2020-10-21

## 2020-10-21 NOTE — REASON FOR VISIT
[Initial Consultation] : an initial consultation for [Other: _____] : [unfilled] [FreeTextEntry2] : right arm and abdominal mass

## 2020-10-21 NOTE — HISTORY OF PRESENT ILLNESS
[de-identified] : Patient referred by Dr. Duncan, for evaluation of right arm and right abdominal nodules. Patient reports increase in size with discomfort right abdomen, when driving.  Patient denies infection, drainage, or other lesions.

## 2020-10-21 NOTE — PHYSICAL EXAM
[Normal] : no neck adenopathy [de-identified] : no cervical or supraclavicular adenopathy, trachea midline, thyroid without enlargement or palpable mass [de-identified] : right forearm nodule 1.5 cm smooth non tender and right abdominal wall subcutaneous nodule 2 x 1.5 cm  , Skin:  normal appearance.  no rash, nodules, vesicles, or erythema,\par Musculoskeletal:  full range of motion and no deformities appreciated\par Neurological:  grossly intact\par Psychiatric:  oriented to person, place and time with appropriate affect

## 2020-10-21 NOTE — ASSESSMENT
[FreeTextEntry1] : Right abdominal wall and right forearm nodules, consistent with lipomas. Patient was interested in surgical excision of right abdominal lesion. He reports this is uncomfortable with his seatbelt while driving. I discussed the risks and benefits including discomfort postop, and possible recurrence.  The patient will discuss with his family and contact my office if he wishes to proceed with surgical excision. If no surgery performed, RTO 4 months to confirm stability.

## 2020-10-21 NOTE — CONSULT LETTER
[Dear  ___] : Dear  [unfilled], [Consult Letter:] : I had the pleasure of evaluating your patient, [unfilled]. [Please see my note below.] : Please see my note below. [Consult Closing:] : Thank you very much for allowing me to participate in the care of this patient.  If you have any questions, please do not hesitate to contact me. [FreeTextEntry2] : Dr. Daljit Duncan [FreeTextEntry3] : Sincerely yours,\par \par Kathia Walters MD, FACS\par Assistant Professor of Surgery\par [facility]

## 2020-10-26 ENCOUNTER — APPOINTMENT (OUTPATIENT)
Dept: ULTRASOUND IMAGING | Facility: CLINIC | Age: 50
End: 2020-10-26
Payer: MEDICAID

## 2020-10-26 ENCOUNTER — OUTPATIENT (OUTPATIENT)
Dept: OUTPATIENT SERVICES | Facility: HOSPITAL | Age: 50
LOS: 1 days | End: 2020-10-26
Payer: MEDICAID

## 2020-10-26 DIAGNOSIS — D17.9 BENIGN LIPOMATOUS NEOPLASM, UNSPECIFIED: ICD-10-CM

## 2020-10-26 DIAGNOSIS — D17.21 BENIGN LIPOMATOUS NEOPLASM OF SKIN AND SUBCUTANEOUS TISSUE OF RIGHT ARM: ICD-10-CM

## 2020-10-26 DIAGNOSIS — D17.1 BENIGN LIPOMATOUS NEOPLASM OF SKIN AND SUBCUTANEOUS TISSUE OF TRUNK: ICD-10-CM

## 2020-10-26 PROCEDURE — 76705 ECHO EXAM OF ABDOMEN: CPT | Mod: 26

## 2020-10-26 PROCEDURE — 76882 US LMTD JT/FCL EVL NVASC XTR: CPT | Mod: 26,RT

## 2020-10-26 PROCEDURE — 76705 ECHO EXAM OF ABDOMEN: CPT

## 2020-10-26 PROCEDURE — 76882 US LMTD JT/FCL EVL NVASC XTR: CPT

## 2020-12-16 ENCOUNTER — OUTPATIENT (OUTPATIENT)
Dept: OUTPATIENT SERVICES | Facility: HOSPITAL | Age: 50
LOS: 1 days | End: 2020-12-16
Payer: MEDICAID

## 2020-12-16 VITALS
RESPIRATION RATE: 14 BRPM | HEART RATE: 73 BPM | TEMPERATURE: 98 F | SYSTOLIC BLOOD PRESSURE: 130 MMHG | DIASTOLIC BLOOD PRESSURE: 80 MMHG | OXYGEN SATURATION: 98 % | HEIGHT: 63 IN | WEIGHT: 171.96 LBS

## 2020-12-16 DIAGNOSIS — D17.1 BENIGN LIPOMATOUS NEOPLASM OF SKIN AND SUBCUTANEOUS TISSUE OF TRUNK: ICD-10-CM

## 2020-12-16 DIAGNOSIS — D17.9 BENIGN LIPOMATOUS NEOPLASM, UNSPECIFIED: ICD-10-CM

## 2020-12-16 LAB
ANION GAP SERPL CALC-SCNC: 10 MMOL/L — SIGNIFICANT CHANGE UP (ref 7–14)
BUN SERPL-MCNC: 14 MG/DL — SIGNIFICANT CHANGE UP (ref 7–23)
CALCIUM SERPL-MCNC: 9.5 MG/DL — SIGNIFICANT CHANGE UP (ref 8.4–10.5)
CHLORIDE SERPL-SCNC: 102 MMOL/L — SIGNIFICANT CHANGE UP (ref 98–107)
CO2 SERPL-SCNC: 26 MMOL/L — SIGNIFICANT CHANGE UP (ref 22–31)
CREAT SERPL-MCNC: 0.73 MG/DL — SIGNIFICANT CHANGE UP (ref 0.5–1.3)
GLUCOSE SERPL-MCNC: 96 MG/DL — SIGNIFICANT CHANGE UP (ref 70–99)
HCT VFR BLD CALC: 42.1 % — SIGNIFICANT CHANGE UP (ref 39–50)
HGB BLD-MCNC: 14 G/DL — SIGNIFICANT CHANGE UP (ref 13–17)
MCHC RBC-ENTMCNC: 29.3 PG — SIGNIFICANT CHANGE UP (ref 27–34)
MCHC RBC-ENTMCNC: 33.3 GM/DL — SIGNIFICANT CHANGE UP (ref 32–36)
MCV RBC AUTO: 88.1 FL — SIGNIFICANT CHANGE UP (ref 80–100)
NRBC # BLD: 0 /100 WBCS — SIGNIFICANT CHANGE UP
NRBC # FLD: 0 K/UL — SIGNIFICANT CHANGE UP
PLATELET # BLD AUTO: 277 K/UL — SIGNIFICANT CHANGE UP (ref 150–400)
POTASSIUM SERPL-MCNC: 4.2 MMOL/L — SIGNIFICANT CHANGE UP (ref 3.5–5.3)
POTASSIUM SERPL-SCNC: 4.2 MMOL/L — SIGNIFICANT CHANGE UP (ref 3.5–5.3)
RBC # BLD: 4.78 M/UL — SIGNIFICANT CHANGE UP (ref 4.2–5.8)
RBC # FLD: 12.9 % — SIGNIFICANT CHANGE UP (ref 10.3–14.5)
SODIUM SERPL-SCNC: 138 MMOL/L — SIGNIFICANT CHANGE UP (ref 135–145)
WBC # BLD: 9.16 K/UL — SIGNIFICANT CHANGE UP (ref 3.8–10.5)
WBC # FLD AUTO: 9.16 K/UL — SIGNIFICANT CHANGE UP (ref 3.8–10.5)

## 2020-12-16 PROCEDURE — 93010 ELECTROCARDIOGRAM REPORT: CPT

## 2020-12-16 RX ORDER — LOSARTAN POTASSIUM 100 MG/1
1 TABLET, FILM COATED ORAL
Qty: 0 | Refills: 0 | DISCHARGE

## 2020-12-16 RX ORDER — ASPIRIN/CALCIUM CARB/MAGNESIUM 324 MG
1 TABLET ORAL
Qty: 0 | Refills: 0 | DISCHARGE

## 2020-12-16 RX ORDER — LEVOTHYROXINE SODIUM 125 MCG
1 TABLET ORAL
Qty: 0 | Refills: 0 | DISCHARGE

## 2020-12-16 RX ORDER — METOPROLOL TARTRATE 50 MG
1 TABLET ORAL
Qty: 0 | Refills: 0 | DISCHARGE

## 2020-12-16 RX ORDER — SODIUM CHLORIDE 9 MG/ML
1000 INJECTION, SOLUTION INTRAVENOUS
Refills: 0 | Status: DISCONTINUED | OUTPATIENT
Start: 2020-12-23 | End: 2021-01-06

## 2020-12-16 NOTE — H&P PST ADULT - NSANTHOSAYNRD_GEN_A_CORE
No. ENZO screening performed.  STOP BANG Legend: 0-2 = LOW Risk; 3-4 = INTERMEDIATE Risk; 5-8 = HIGH Risk Yes

## 2020-12-16 NOTE — H&P PST ADULT - CONSTITUTIONAL
Alistair Stanford  : 1952  Primary: [facility]  Secondary: [address] at [facility]  [address], [facility], [address]  [phone]   [phone]         OUTPATIENT OCCUPATIONAL THERAPY: Daily Note 2018    ICD-10: Treatment Diagnosis: I89.0, lymphedema not elsewhere classified  G89.0, pain not elsewhere classified  M62.81, muscle weakness generalized  Precautions/Allergies:   Codeine and Tramadol   Fall Risk Score: 1 (? 5 = High Risk)  MD Orders: eval and treat MEDICAL/REFERRING DIAGNOSIS:   Localized edema [R60.0]   DATE OF ONSET:    REFERRING PHYSICIAN: Nancy Funez MD  RETURN PHYSICIAN APPOINTMENT: to be determined      INITIAL ASSESSMENT:  Ms. Steve Delgadillo presents with bilateral lower extremity 3+/5 pitting edema from her knees to her feet. She has history of THR in . She reports her swelling started in  and is not reversible. She has no tissue changes or history of cellulitis. 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 She is wearing 15-23 mmHg compression zipper socks daily at work. She reports tightness, limb heaviness, and pain. Pt is also diabetic with neuropathy in her hands and feet. She lives with her  and two adult children. She is here to manage her leg swelling. PLAN OF CARE:   PROBLEM LIST:  1. Edema/Girth  2. Decreased Knowledge of Precautions  3. Decreased Skin Integrity/Hygeine  4. Decreased [place] with Home Exercise Program INTERVENTIONS PLANNED  1. Skin care  2. Compression bandaging  3. Fitting for compression garment(s)  4. Manual therapy/Manual lymph drainage  5. Therapeutic exercise/Therapeutic activities  6. Patient Education  7. Compression pump trial prn  8.  kinesiotaping    TREATMENT PLAN:  Effective Dates: 3/12/18 TO 18.   Frequency/Duration:  2x a week for 90 days and upon reassessment will adjust frequency and duration as progress indicates. GOALS: (Goals have been discussed and agreed upon with patient.)  Short-Term Functional Goals: Time Frame: 45 days  1. The patient/caregiver will verbalize understanding of lymphedema precautions. 2. Patient will be independent with skin care regimen to decrease risk of cellulitis. 3. The patient/caregiver will be independent at donning and doffing  lower extremity compression bandages. 4. The patient/caregiver will be independent with self-manual lymph drainage techniques and show decrease in limb volume. 5. Patient will be independent in lymphatic exercises. Discharge Goals: Time Frame: 90 days  1. Patient's bilateral lower extremity circumferential measurements will decrease 5 to 10 cm on volumetric graph to maximize functional use in ADL's.    2. The patient/caregiver will be independent with home management of lymphedema. 3. Patient/caregiver will be independent donning and doffing bilateral lower extremity compression garment. Rehabilitation Potential For Stated Goals: Fair  Regarding Eduardo Huang's therapy, I certify that the treatment plan above will be carried out by a therapist or under their direction. Thank you for this referral,  Mora Hart OTR/L     Referring Physician Signature: Khloe Miner MD _________________________  Date _________            The information in this section was collected on 4/5/2018   (except where otherwise noted). OCCUPATIONAL PROFILE & HISTORY:   History of Present Injury/Illness (Reason for Referral):    Ms. Diamante Fuentes presents with bilateral lower extremity 3+/5 pitting edema from her knees to her feet. She has history of THR in 2015. She reports her swelling started in 2017 and is not reversible. She has no tissue changes or history of cellulitis. 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 She is wearing 15-23 mmHg compression zipper socks daily at work.   She reports tightness, limb heaviness, and pain. Pt is also diabetic with neuropathy in her hands and feet. She lives with her  and two adult children. She is here to manage her leg swelling. Past Medical History/Comorbidities:   Ms. Mic Acosta  has a past medical history of Chronic musculoskeletal pain; Cystocele with rectocele; Essential hypertension (2/9/2016); GERD (gastroesophageal reflux disease); Hip fracture requiring operative repair ([facility] (1/9/2015); Hip fracture, left ([facility] (1/7/2015); Hyperlipidemia; Hypothyroidism; Impaired mobility and ADLs; Pure hypercholesterolemia (2/9/2016); and Type 2 diabetes mellitus without complication ([facility].) (8/8/1183). Ms. Mic Acosta  has a past surgical history that includes hx tonsillectomy; hx tubal ligation; hx dilation and curettage; hx colonoscopy; hx magali and bso; and hx hip fracture tx (Left). Social History/Living Environment:        Pt lives with her  and 2 adult children  Prior Level of Function/Work/Activity:  Pt works full time in a school [employer]. She is independent with ADLs/IADLs with difficulty donning and doffing socks and shoes due to s/p THR and limited hip flexion    Current Medications:    Current Outpatient Prescriptions:     TURMERIC, BULK,, by Does Not Apply route., Disp: , Rfl:     HUMALOG KWIKPEN 100 unit/mL kwikpen, INJECT 40  UNITS SUBCUTANEOUSLY AT BEDTIME, Disp: 15 Pen, Rfl: 11    ezetimibe (ZETIA) 10 mg tablet, Take 1 Tab by mouth daily. , Disp: 90 Tab, Rfl: 3    gabapentin (NEURONTIN) 600 mg tablet, TAKE ONE TABLET BY MOUTH THREE TIMES DAILY, Disp: 270 Tab, Rfl: 3    cyclobenzaprine (FLEXERIL) 10 mg tablet, Take 1 Tab by mouth daily. , Disp: 90 Tab, Rfl: 3    traMADol (ULTRAM) 50 mg tablet, TAKE ONE TABLET BY MOUTH TWICE DAILY AS NEEDED FOR 30 DAYS, Disp: 60 Tab, Rfl: 5    BD INSULIN PEN NEEDLE UF SHORT 31 gauge x 5/16\" ndle, USE  TWICE DAILY AS DIRECTED WITH  INSULIN  PENS, Disp: 100 Pen Needle, Rfl: [facility]. 300 unit/mL (1.5 mL) inpn, INJECT 70  UNITS SUBCUTANEOUSLY ONCE DAILY WITH DINNER, Disp: 18 Pen, Rfl: 11    glyBURIDE-metFORMIN (GLUCOVANCE) 5-500 mg per tablet, Take 1 Tab by mouth Daily (before breakfast). Indications: type 2 diabetes mellitus, Disp: 90 Tab, Rfl: 3    atorvastatin (LIPITOR) 80 mg tablet, Take 1 Tab by mouth daily. Indications: hyperlipidemia, Disp: 90 Tab, Rfl: 3    oxybutynin chloride XL (DITROPAN XL) 10 mg CR tablet, Take 1 Tab by mouth daily. , Disp: 30 Tab, Rfl: 11    meloxicam (MOBIC) 15 mg tablet, Take 1 Tab by mouth daily. , Disp: 30 Tab, Rfl: 11    cyanocobalamin 1,000 mcg tablet, Take 1,000 mcg by mouth daily. , Disp: , Rfl:     levothyroxine (SYNTHROID) 100 mcg tablet, TAKE ONE TABLET BY MOUTH ONCE DAILY BEFORE BREAKFAST FOR HYPOTHYROIDISM, Disp: 90 Tab, Rfl: 3    ONETOUCH ULTRA TEST strip, USE TO TEST BLOOD SUGARS THREE TIMES A DAY, Disp: 300 Strip, Rfl: 11    ONETOUCH ULTRA TEST strip, TEST BLOOD SUGAR TWICE A   DAY, Disp: 200 Strip, Rfl: 2    CALCIUM CARB/VIT D3/MINERALS (CALCIUM-VITAMIN D PO), Take  by mouth., Disp: , Rfl:     MULTIVITAMIN PO, Take  by mouth., Disp: , Rfl:             Date Last Reviewed:  4/5/2018     Complexity Level : Expanded review of therapy/medical records (1-2):  MODERATE COMPLEXITY   ASSESSMENT OF OCCUPATIONAL PERFORMANCE:   Palpation:          Pitting 3+/5 lower extremity edema from knees to feet bilaterally  Skin Integrity:          Excessive dryness, otherwise no tissue changes  Sensation:  Neuropathy in feet  Functional Mobility:  Ambulates without assistive device; independent  Activities of Daily Living Mod A shoes and socks due to limited hip flexion; has sock aid and long shoe horn at home  Edema/Girth:  3+ and pitting    Right Left    Initial Most Recent Initial Most Recent   Lower  Extremity 137 cm   139 cm        PRETREATMENT AFFECTED LIMB(s): right lower extremity  left lower extremity      Date:  3/12/18 3/12/18        Right / Left Right Left         Groin   []      []           8 inches   []      []           4 inches   []      []         PoplitealSpace   []      [] 32.0 cm 33.0 cm         8 inches   []      [] 33.0 33.5         4 inches   []      [] 29.5 30.0         Ankle   []      [] 24.0 24.5         Instep   []      [] 18.5 18.0       Measurements are taken in centimeters:  2.54 cm = 1 inch                Physical Skills Involved:  1. Sensation  2. Pain (Chronic)  3. Edema  4. Skin Integrity  5. tightness/heaviness Cognitive Skills Affected (resulting in the inability to perform in a timely and safe manner):  1. N/A Psychosocial Skills Affected:  1. Habits/Routines   Number of elements that affect the Plan of Care: 3-5:  MODERATE COMPLEXITY   CLINICAL DECISION MAKING:   Outcome Measure: Tool Used: Lymphedema Life Impact Scale   Score:  Initial: 14 Most Recent: X (Date: -- )   Interpretation of Score: The Lymphedema Life Impact Scale (LLIS) is a validated instrument that measures the physical, functional, and psychosocial concerns pertinent to patients with extremity lymphedema. The Scale's questionnaire is administered to patients to gauge impairments, activity limitations, and participation restrictions resulting from their lymphedema. Score 0 1-13 14-26 27-40 41-54 55-67 68   Modifier CH CI CJ CK CL CM CN     ? Other PT/OT Primary Functional Limitations:     - CURRENT STATUS: CJ - 20%-39% impaired, limited or restricted    - GOAL STATUS: CI - 1%-19% impaired, limited or restricted    - D/C STATUS:  ---------------To be determined---------------       Medical Necessity:   · Patient is expected to demonstrate progress in reduction of edema to reduce risk for cellulitis. Reason for Services/Other Comments:  · Patient continues to require skilled intervention due to bilateral lower extremity lymphedema.      Use of outcome tool(s) and clinical judgement create a POC that gives a: Questionable prediction of patient's progress: MODERATE COMPLEXITY   TREATMENT:   (In addition to Assessment/Re-Assessment sessions the following treatments were rendered)  4/5/2018      Pre-treatment Symptoms/Complaints:  Pt with decrease of 13.5cm  R LE ans 9 cm L LE circumferencial volumetric measurements. Requested prescription for class 1 compression hose. Pt is stabilizing with reduction; needs long term compression management garments  Pain: Initial:   Pain Intensity 1: 2  Post Session:  2   Occupational Therapy Treatments:    OT eval(X  ) OT eval was completed. Pt received information on lymphedema and risk reduction/self management practices as outlined by the National Lymphedema Network. Therapeutic Exercise (minutes):     HEP:  As above; handouts given to patient for all exercises. Manual Therapy:  60 minutes   MLD followed by skin care and 20 minute compression pump trial.          Manual Lymph Drainage:( 50 minutes)           Lymph Nodes:    Cervical Supraclavicular Axillary Abdominal Inguinal Popliteal Antecubital   RIGHT [x]     [x]     [x]     []     [x]     [x]     []       LEFT [x]     [x]     [x]     []     [x]     [x]     []          Anastamoses:   Axillo-axillary Inguino-inguinal Axillo-inguinal Inguino-axillary   ANTERIOR []     []     []     [x]       POSTERIOR []     []     []     []       RIGHT []     []     []     [x]       LEFT []     []     []     [x]         Limbs:   []    RUE     []    LUE     [x]    RLE    [x]    LLE   Compression: (10 Minutes):  Ahmed Graver applied. Pt deferred bandaging. Measured for compression hose and recommend Mediven 20-30 mm Hg, size 3, open toe natural standard length. Waiting for MD prescription; refaxed request      Treatment/Session Assessment:    · Response to Treatment:  Pt in agreement with POC and goals. · Compliance with Program/Exercises:Good compliance  · Recommendations/Intent for next treatment session:  \"Next visit will focus on complete decongestive therapy and long term compression management  Total Treatment Duration:  45 minutes  OT Patient Time In/Time Out  Time In: 1100  Time Out: [address], OT detailed exam

## 2020-12-16 NOTE — H&P PST ADULT - HISTORY OF PRESENT ILLNESS
51y/o male scheduled for excision lipoma right abdomen on 12/23/2020.  Pt states, "right abdomen lipoma for the past 6 months causing discomfort."

## 2020-12-16 NOTE — H&P PST ADULT - RS GEN PE MLT RESP DETAILS PC
breath sounds equal/good air movement/clear to auscultation bilaterally/no chest wall tenderness/no intercostal retractions/no rales/no rhonchi/no wheezes

## 2020-12-16 NOTE — H&P PST ADULT - NSICDXPASTMEDICALHX_GEN_ALL_CORE_FT
PAST MEDICAL HISTORY:  Hypertension     Hypothyroid      PAST MEDICAL HISTORY:  Hypertension     Hypothyroid     Lipoma     Seasonal allergies     Sleep apnea

## 2020-12-16 NOTE — H&P PST ADULT - GASTROINTESTINAL DETAILS
soft/nontender/no distention/bowel sounds normal/no bruit/no rebound tenderness/no guarding/no rigidity/no organomegaly

## 2020-12-16 NOTE — H&P PST ADULT - NSICDXPROBLEM_GEN_ALL_CORE_FT
PROBLEM DIAGNOSES  Problem: Lipoma  Assessment and Plan: Pt scheduled for excision lipoma right abdomen on 12/23/2020.  labs done results pending, ekg done.  Pt is scheduled for preop covid testing.  HIbiclens provided:  written and verbal instructions given, pt able to verbalize understanding.  Preop teaching done, pt able to verbalize understanding.   OR booking notified of sleep apnea  meds day of surgery pepcid, levothyroxine, amlodipine.  ASR work sheet-  requesting sleep study  cardiac cath 2017, comparison EKG in chart.

## 2020-12-18 DIAGNOSIS — Z01.818 ENCOUNTER FOR OTHER PREPROCEDURAL EXAMINATION: ICD-10-CM

## 2020-12-20 ENCOUNTER — APPOINTMENT (OUTPATIENT)
Dept: DISASTER EMERGENCY | Facility: CLINIC | Age: 50
End: 2020-12-20

## 2020-12-22 ENCOUNTER — TRANSCRIPTION ENCOUNTER (OUTPATIENT)
Age: 50
End: 2020-12-22

## 2020-12-22 LAB — SARS-COV-2 N GENE NPH QL NAA+PROBE: NOT DETECTED

## 2020-12-23 ENCOUNTER — APPOINTMENT (OUTPATIENT)
Dept: SURGERY | Facility: HOSPITAL | Age: 50
End: 2020-12-23

## 2020-12-23 ENCOUNTER — RESULT REVIEW (OUTPATIENT)
Age: 50
End: 2020-12-23

## 2020-12-23 ENCOUNTER — OUTPATIENT (OUTPATIENT)
Dept: OUTPATIENT SERVICES | Facility: HOSPITAL | Age: 50
LOS: 1 days | Discharge: HOME | End: 2020-12-23
Payer: MEDICAID

## 2020-12-23 VITALS
DIASTOLIC BLOOD PRESSURE: 80 MMHG | WEIGHT: 171.96 LBS | HEIGHT: 63 IN | HEART RATE: 75 BPM | RESPIRATION RATE: 16 BRPM | SYSTOLIC BLOOD PRESSURE: 130 MMHG | TEMPERATURE: 98 F | OXYGEN SATURATION: 99 %

## 2020-12-23 VITALS
OXYGEN SATURATION: 98 % | RESPIRATION RATE: 18 BRPM | HEART RATE: 71 BPM | SYSTOLIC BLOOD PRESSURE: 113 MMHG | DIASTOLIC BLOOD PRESSURE: 67 MMHG

## 2020-12-23 DIAGNOSIS — D17.1 BENIGN LIPOMATOUS NEOPLASM OF SKIN AND SUBCUTANEOUS TISSUE OF TRUNK: ICD-10-CM

## 2020-12-23 PROCEDURE — 88304 TISSUE EXAM BY PATHOLOGIST: CPT | Mod: 26

## 2020-12-23 PROCEDURE — 11406 EXC TR-EXT B9+MARG >4.0 CM: CPT

## 2020-12-23 RX ORDER — LORATADINE 10 MG/1
1 TABLET ORAL
Qty: 0 | Refills: 0 | DISCHARGE

## 2020-12-23 RX ORDER — ACETAMINOPHEN 500 MG
2 TABLET ORAL
Qty: 0 | Refills: 0 | DISCHARGE
Start: 2020-12-23

## 2020-12-23 RX ORDER — ACETAMINOPHEN 500 MG
650 TABLET ORAL EVERY 6 HOURS
Refills: 0 | Status: DISCONTINUED | OUTPATIENT
Start: 2020-12-23 | End: 2021-01-06

## 2020-12-23 RX ORDER — ASPIRIN/CALCIUM CARB/MAGNESIUM 324 MG
1 TABLET ORAL
Qty: 0 | Refills: 0 | DISCHARGE

## 2020-12-23 RX ORDER — LEVOTHYROXINE SODIUM 125 MCG
1 TABLET ORAL
Qty: 0 | Refills: 0 | DISCHARGE

## 2020-12-23 RX ORDER — AMLODIPINE BESYLATE 2.5 MG/1
1 TABLET ORAL
Qty: 0 | Refills: 0 | DISCHARGE

## 2020-12-23 NOTE — BRIEF OPERATIVE NOTE - NSICDXBRIEFPROCEDURE_GEN_ALL_CORE_FT
PROCEDURES:  Excision of subcutaneous lipoma 3 cm or more in diameter from abdominal wall 23-Dec-2020 11:29:14 Exicision of right abominal wall lipoma Jose Michelle

## 2020-12-23 NOTE — ASU DISCHARGE PLAN (ADULT/PEDIATRIC) - CARE PROVIDER_API CALL
Kathia Walters  SURGERY  [address] [place] [address], [address]  [place] [address]  Phone: [phone]  Fax: [phone]  Scheduled Appointment: 01/05/2021

## 2020-12-23 NOTE — ASU DISCHARGE PLAN (ADULT/PEDIATRIC) - ASU DC SPECIAL INSTRUCTIONSFT
Keep dressings dry for 48 hours, then may shower with steri strips.   Take OTC Tylenol 325mg, 2 tabs every 6 hours as needed for pain.   May restart Aspirin 81 mg after 48 hours.   *Follow up with Dr. Walters on (1/5/2021), please call the office for an appointment.

## 2020-12-23 NOTE — ASU DISCHARGE PLAN (ADULT/PEDIATRIC) - NURSING INSTRUCTIONS
You were given intravenous TYLENOL for pain management at 11:00 am. Please DO NOT take any products containing TYLENOL or ACETAMINOPHEN, such as VICODIN, PERCOCET, EXCEDRIN, and any over-the-counter cold medication for the next 6 hours until 5:00 pm. DO NOT TAKE MORE THAN 3000 MG OF TYLENOL in a 24 hour period.

## 2020-12-28 PROBLEM — J30.2 OTHER SEASONAL ALLERGIC RHINITIS: Chronic | Status: ACTIVE | Noted: 2020-12-16

## 2020-12-28 PROBLEM — G47.30 SLEEP APNEA, UNSPECIFIED: Chronic | Status: ACTIVE | Noted: 2020-12-16

## 2020-12-28 PROBLEM — D17.9 BENIGN LIPOMATOUS NEOPLASM, UNSPECIFIED: Chronic | Status: ACTIVE | Noted: 2020-12-16

## 2021-01-05 ENCOUNTER — APPOINTMENT (OUTPATIENT)
Dept: SURGERY | Facility: CLINIC | Age: 51
End: 2021-01-05
Payer: MEDICAID

## 2021-01-05 ENCOUNTER — EMERGENCY (EMERGENCY)
Facility: HOSPITAL | Age: 51
LOS: 1 days | Discharge: HOME | End: 2021-01-05
Admitting: HOSPITALIST
Payer: MEDICAID

## 2021-01-05 VITALS
RESPIRATION RATE: 15 BRPM | HEIGHT: 63 IN | HEART RATE: 80 BPM | OXYGEN SATURATION: 100 % | SYSTOLIC BLOOD PRESSURE: 138 MMHG | DIASTOLIC BLOOD PRESSURE: 71 MMHG | TEMPERATURE: 99 F

## 2021-01-05 DIAGNOSIS — D17.1 BENIGN LIPOMATOUS NEOPLASM OF SKIN AND SUBCUTANEOUS TISSUE OF TRUNK: ICD-10-CM

## 2021-01-05 LAB — SURGICAL PATHOLOGY STUDY: SIGNIFICANT CHANGE UP

## 2021-01-05 PROCEDURE — 99024 POSTOP FOLLOW-UP VISIT: CPT

## 2021-01-05 PROCEDURE — 99283 EMERGENCY DEPT VISIT LOW MDM: CPT

## 2021-01-05 RX ORDER — AZELASTINE HYDROCHLORIDE 205.5 UG/1
0.15 SPRAY, METERED NASAL
Qty: 30 | Refills: 0 | Status: ACTIVE | COMMUNITY
Start: 2020-12-26

## 2021-01-05 NOTE — HISTORY OF PRESENT ILLNESS
[de-identified] : Patient referred by Dr. Duncan, for evaluation of right arm and right abdominal nodules. Patient reports increase in size with discomfort right abdomen, when driving.  Patient denies infection, drainage, or other lesions.\par 12/23/2020 excision right flank mass.  denies pain or draiange, path pending.

## 2021-01-05 NOTE — ASSESSMENT
[FreeTextEntry1] : Right abdominal wall and right forearm nodules, consistent with lipomas. doing well s/p excision right abd lipoma, path pending, questions answered. RTo 4 mo

## 2021-01-05 NOTE — PHYSICAL EXAM
[de-identified] : no cervical or supraclavicular adenopathy, trachea midline, thyroid without enlargement or palpable mass [Normal] : no neck adenopathy [de-identified] : right forearm nodule 1.5 cm smooth non tender and right abdominal  incision healing with mild scabbing, scar min disucssed.   , Skin:  normal appearance.  no rash, nodules, vesicles, or erythema,\par Musculoskeletal:  full range of motion and no deformities appreciated\par Neurological:  grossly intact\par Psychiatric:  oriented to person, place and time with appropriate affect

## 2021-01-06 VITALS
RESPIRATION RATE: 15 BRPM | OXYGEN SATURATION: 100 % | HEART RATE: 68 BPM | TEMPERATURE: 98 F | DIASTOLIC BLOOD PRESSURE: 78 MMHG | SYSTOLIC BLOOD PRESSURE: 126 MMHG

## 2021-01-06 LAB
B PERT DNA SPEC QL NAA+PROBE: SIGNIFICANT CHANGE UP
C PNEUM DNA SPEC QL NAA+PROBE: SIGNIFICANT CHANGE UP
FLUAV H1 2009 PAND RNA SPEC QL NAA+PROBE: SIGNIFICANT CHANGE UP
FLUAV H1 RNA SPEC QL NAA+PROBE: SIGNIFICANT CHANGE UP
FLUAV H3 RNA SPEC QL NAA+PROBE: SIGNIFICANT CHANGE UP
FLUAV SUBTYP SPEC NAA+PROBE: SIGNIFICANT CHANGE UP
FLUBV RNA SPEC QL NAA+PROBE: SIGNIFICANT CHANGE UP
HADV DNA SPEC QL NAA+PROBE: SIGNIFICANT CHANGE UP
HCOV PNL SPEC NAA+PROBE: SIGNIFICANT CHANGE UP
HMPV RNA SPEC QL NAA+PROBE: SIGNIFICANT CHANGE UP
HPIV1 RNA SPEC QL NAA+PROBE: SIGNIFICANT CHANGE UP
HPIV2 RNA SPEC QL NAA+PROBE: SIGNIFICANT CHANGE UP
HPIV3 RNA SPEC QL NAA+PROBE: SIGNIFICANT CHANGE UP
HPIV4 RNA SPEC QL NAA+PROBE: SIGNIFICANT CHANGE UP
RAPID RVP RESULT: DETECTED
RV+EV RNA SPEC QL NAA+PROBE: SIGNIFICANT CHANGE UP
SARS-COV-2 RNA SPEC QL NAA+PROBE: DETECTED

## 2021-01-06 NOTE — ED PROVIDER NOTE - OBJECTIVE STATEMENT
51 y/o M presents to ED with body aches x today, requesting COVID swab. Wife and son both have COVID. Pt denies any chest pain, sob, cough, abd pain, n/v/d, headache, dizziness. Pt only reporting b/l shoulder and LE pain. States pain is aching. No other complaints. O2 sat 100% on RA.

## 2021-01-06 NOTE — ED ADULT NURSE NOTE - COVID-19 RESULT
BRIEF OPERATIVE NOTE    Date of Procedure: 7/14/2017   Preoperative Diagnosis: L ureteral stone  Postoperative Diagnosis: same     Procedure(s):  CYSTOSCOPY/ LEFT URETEROSCOPY/STONE EXTRACTION/LEFT URETERAL STENT PLACEMENT  Surgeon(s) and Role:     * Melanie Tan MD - Primary         Assistant Staff:       Surgical Staff:  Circ-1: Trisha Reyez RN  Circ-2: Lena Larson RN  Event Time In   Incision Start 1036   Incision Close 1049     Anesthesia: General   Estimated Blood Loss: MINIMAL  Specimens: * No specimens in log *   Findings: see dictation  Complications: none apparent  Implants:   Implant Name Type Inv. Item Serial No.  Lot No. LRB No. Used Action   [employer]ENT [employer] 4. [id number] Lashawn Nash - [id number]   ST[employer] 4. [id number] -- [facility] 19 [id number] 1 Implanted NEGATIVE

## 2021-01-06 NOTE — ED ADULT NURSE NOTE - OBJECTIVE STATEMENT
Pt 50-year-old male A&OX4, ambulatory no pertinent Phx coming in with body aches and a recent exposure to Covid. RVP sent. To be D/C.

## 2021-01-06 NOTE — ED PROVIDER NOTE - CLINICAL SUMMARY MEDICAL DECISION MAKING FREE TEXT BOX
51 y/o M requesting COVID 19 test. Wife and son both COVID +  plan  - COVID swab  - follow up with PCP.

## 2021-01-06 NOTE — ED PROVIDER NOTE - PATIENT PORTAL LINK FT
You can access the FollowMyHealth Patient Portal offered by [facility] by registering at the following website: [email or web address]. By joining [employer]s FollowMyHealth portal, you will also be able to view your health information using other applications (apps) compatible with our system.

## 2021-02-11 ENCOUNTER — APPOINTMENT (OUTPATIENT)
Dept: SURGERY | Facility: CLINIC | Age: 51
End: 2021-02-11

## 2021-04-19 NOTE — ED ADULT TRIAGE NOTE - BP NONINVASIVE DIASTOLIC (MM HG)
Hematology Oncology Encounter    Teaching completed:    In-person:    ECOG [04/19/21 5023]   ECOG Performance Status 1       Chief Complaint:   Squamous cell carcinoma of the lung     History of Present Illness:   Ms. Corrales presents today with her daughter, Adina, for education regarding planned chemotherapy to include pembrolizumab.  Treatment is scheduled to start 04/22/21.    Disease Education:  Education regarding diagnosis reviewed with patient.  Written information regarding diagnosis given to patient.  Patient acknowledges understanding of disease and treatment. We discussed the differences between immunotherapy and chemotherapy, including differences in mechanism of action.     Chemotherapy Education:     Barriers to learning: None.    Pt received instructions regarding general clinic and infusion room information, blood tests, , and dietary information.  We also discussed drug administration procedures.     Tigist was provided with :  Phone numbers for contacting MD/RN during clinic hours and MD post clinic hours for emergent issues. The missed appointment policy and expectations for rescheduling or cancelling were reviewed with patient.    Side effect sheets and Phone numbers for contacting MD/RN during clinic hours and MD post clinic hours for emergent issues    We reviewed side effects including: fatigue, pruritus, immune-mediated side effects, including but not limited to, thyroid issues, hepatitis, colitis, skin toxicity, pneumonitis.  Discussed in detail the following recommendations with Ms. Corrales:    · Before starting chemotherapy  treatment, make sure you tell your doctor about any other medications you are taking (including prescription, over-the-counter, vitamins, herbal remedies, etc.). Do not take aspirin, or products containing aspirin unless your doctor specifically permits this.   · Do not receive any kind of immunization or vaccination without your doctor's approval while  taking chemotherapy.        Discussed with Ms. Corrales the possibility of allergic reactions brought on by chemotherapy treatments, and instructed to seek help immediately if the following symptoms develop:  • Shortness of breath, wheezing, difficulty breathing, closing up of the throat, swelling of facial features, hives (possible allergic reaction).    Discussed Port access devices   Reviewed self care:  when to call MD/RN, good handwashing, monitoring of temperature, use of antiemetics, OTC meds for management of constipation or diarrhea, good oral hygiene, rest and activity, diet and hydration.        Learner Outcomes: Ongoing reinforcement required. Patient and/or caregiver verbalize understanding of information given    Assessment:    Knowledge Deficit secondary to new cancer diagnosis and treatment plan.    Encounter Diagnosis:  Squamous cell carcinoma of the lung    Plan:    51 minutes of consultation time were spent directly with Ms. Corrales and Adina discussing the chemotherapy drugs that are scheduled to be given, Keytruda.  We discussed side effects that commonly occur as well as some less common side effects. Emotional support provided.  Consent reviewed with patient and signed by both patient and provider.  All questions were answered to the best of my ability.  Intent of treatment: palliative .  Tigist will follow up with Dr. Giordano as previously scheduled.  Patient discharged with printed teaching information and copy of the consent.  Patient encouraged to call with any interval concerns.     84

## 2021-04-27 ENCOUNTER — EMERGENCY (EMERGENCY)
Facility: HOSPITAL | Age: 51
LOS: 1 days | Discharge: HOME | End: 2021-04-27
Attending: STUDENT IN AN ORGANIZED HEALTH CARE EDUCATION/TRAINING PROGRAM | Admitting: EMERGENCY MEDICINE
Payer: MEDICAID

## 2021-04-27 VITALS
OXYGEN SATURATION: 100 % | HEART RATE: 96 BPM | TEMPERATURE: 98 F | DIASTOLIC BLOOD PRESSURE: 92 MMHG | RESPIRATION RATE: 18 BRPM | HEIGHT: 63 IN | SYSTOLIC BLOOD PRESSURE: 136 MMHG

## 2021-04-27 LAB
ALBUMIN SERPL ELPH-MCNC: 4.3 G/DL — SIGNIFICANT CHANGE UP (ref 3.3–5)
ALP SERPL-CCNC: 58 U/L — SIGNIFICANT CHANGE UP (ref 40–120)
ALT FLD-CCNC: 26 U/L — SIGNIFICANT CHANGE UP (ref 4–41)
ANION GAP SERPL CALC-SCNC: 11 MMOL/L — SIGNIFICANT CHANGE UP (ref 7–14)
AST SERPL-CCNC: 27 U/L — SIGNIFICANT CHANGE UP (ref 4–40)
BASOPHILS # BLD AUTO: 0.08 K/UL — SIGNIFICANT CHANGE UP (ref 0–0.2)
BASOPHILS NFR BLD AUTO: 0.9 % — SIGNIFICANT CHANGE UP (ref 0–2)
BILIRUB SERPL-MCNC: <0.2 MG/DL — SIGNIFICANT CHANGE UP (ref 0.2–1.2)
BUN SERPL-MCNC: 9 MG/DL — SIGNIFICANT CHANGE UP (ref 7–23)
CALCIUM SERPL-MCNC: 8.9 MG/DL — SIGNIFICANT CHANGE UP (ref 8.4–10.5)
CHLORIDE SERPL-SCNC: 105 MMOL/L — SIGNIFICANT CHANGE UP (ref 98–107)
CO2 SERPL-SCNC: 25 MMOL/L — SIGNIFICANT CHANGE UP (ref 22–31)
CREAT SERPL-MCNC: 0.68 MG/DL — SIGNIFICANT CHANGE UP (ref 0.5–1.3)
EOSINOPHIL # BLD AUTO: 0.37 K/UL — SIGNIFICANT CHANGE UP (ref 0–0.5)
EOSINOPHIL NFR BLD AUTO: 4 % — SIGNIFICANT CHANGE UP (ref 0–6)
GLUCOSE SERPL-MCNC: 120 MG/DL — HIGH (ref 70–99)
HCT VFR BLD CALC: 40.3 % — SIGNIFICANT CHANGE UP (ref 39–50)
HGB BLD-MCNC: 14 G/DL — SIGNIFICANT CHANGE UP (ref 13–17)
IANC: 4.16 K/UL — SIGNIFICANT CHANGE UP (ref 1.5–8.5)
IMM GRANULOCYTES NFR BLD AUTO: 0.3 % — SIGNIFICANT CHANGE UP (ref 0–1.5)
INR BLD: 1.11 RATIO — SIGNIFICANT CHANGE UP (ref 0.88–1.16)
LYMPHOCYTES # BLD AUTO: 3.64 K/UL — HIGH (ref 1–3.3)
LYMPHOCYTES # BLD AUTO: 39.3 % — SIGNIFICANT CHANGE UP (ref 13–44)
MCHC RBC-ENTMCNC: 29.7 PG — SIGNIFICANT CHANGE UP (ref 27–34)
MCHC RBC-ENTMCNC: 34.7 GM/DL — SIGNIFICANT CHANGE UP (ref 32–36)
MCV RBC AUTO: 85.6 FL — SIGNIFICANT CHANGE UP (ref 80–100)
MONOCYTES # BLD AUTO: 0.99 K/UL — HIGH (ref 0–0.9)
MONOCYTES NFR BLD AUTO: 10.7 % — SIGNIFICANT CHANGE UP (ref 2–14)
NEUTROPHILS # BLD AUTO: 4.16 K/UL — SIGNIFICANT CHANGE UP (ref 1.8–7.4)
NEUTROPHILS NFR BLD AUTO: 44.8 % — SIGNIFICANT CHANGE UP (ref 43–77)
NRBC # BLD: 0 /100 WBCS — SIGNIFICANT CHANGE UP
NRBC # FLD: 0 K/UL — SIGNIFICANT CHANGE UP
PLATELET # BLD AUTO: 253 K/UL — SIGNIFICANT CHANGE UP (ref 150–400)
POTASSIUM SERPL-MCNC: 3.6 MMOL/L — SIGNIFICANT CHANGE UP (ref 3.5–5.3)
POTASSIUM SERPL-SCNC: 3.6 MMOL/L — SIGNIFICANT CHANGE UP (ref 3.5–5.3)
PROT SERPL-MCNC: 7.3 G/DL — SIGNIFICANT CHANGE UP (ref 6–8.3)
PROTHROM AB SERPL-ACNC: 12.6 SEC — SIGNIFICANT CHANGE UP (ref 10.6–13.6)
RBC # BLD: 4.71 M/UL — SIGNIFICANT CHANGE UP (ref 4.2–5.8)
RBC # FLD: 13 % — SIGNIFICANT CHANGE UP (ref 10.3–14.5)
SODIUM SERPL-SCNC: 141 MMOL/L — SIGNIFICANT CHANGE UP (ref 135–145)
TROPONIN T, HIGH SENSITIVITY RESULT: <6 NG/L — SIGNIFICANT CHANGE UP
TROPONIN T, HIGH SENSITIVITY RESULT: <6 NG/L — SIGNIFICANT CHANGE UP
WBC # BLD: 9.27 K/UL — SIGNIFICANT CHANGE UP (ref 3.8–10.5)
WBC # FLD AUTO: 9.27 K/UL — SIGNIFICANT CHANGE UP (ref 3.8–10.5)

## 2021-04-27 PROCEDURE — 93010 ELECTROCARDIOGRAM REPORT: CPT

## 2021-04-27 PROCEDURE — 71046 X-RAY EXAM CHEST 2 VIEWS: CPT | Mod: 26

## 2021-04-27 PROCEDURE — 99218: CPT | Mod: 25

## 2021-04-27 RX ORDER — ASPIRIN/CALCIUM CARB/MAGNESIUM 324 MG
162 TABLET ORAL ONCE
Refills: 0 | Status: COMPLETED | OUTPATIENT
Start: 2021-04-27 | End: 2021-04-27

## 2021-04-27 RX ORDER — ASPIRIN/CALCIUM CARB/MAGNESIUM 324 MG
81 TABLET ORAL DAILY
Refills: 0 | Status: DISCONTINUED | OUTPATIENT
Start: 2021-04-27 | End: 2021-05-01

## 2021-04-27 RX ORDER — AMLODIPINE BESYLATE 2.5 MG/1
5 TABLET ORAL DAILY
Refills: 0 | Status: DISCONTINUED | OUTPATIENT
Start: 2021-04-27 | End: 2021-05-01

## 2021-04-27 RX ORDER — LEVOTHYROXINE SODIUM 125 MCG
25 TABLET ORAL DAILY
Refills: 0 | Status: DISCONTINUED | OUTPATIENT
Start: 2021-04-27 | End: 2021-05-01

## 2021-04-27 RX ADMIN — Medication 162 MILLIGRAM(S): at 21:30

## 2021-04-27 NOTE — ED ADULT TRIAGE NOTE - CHIEF COMPLAINT QUOTE
chest pain x2 days, worsening with inspiration. patient has no prior stents. hx: htn. ekg in progress.

## 2021-04-27 NOTE — ED CDU PROVIDER INITIAL DAY NOTE - ATTENDING CONTRIBUTION TO CARE
Luis ROSALES: I agree with the above provided history and exam    I Carlos Smith MD performed a history and physical exam of the patient and discussed their management with the resident and /or advanced care provider. I reviewed the resident and /or ACP's note and agree with the documented findings and plan of care. My medical decision making and observations are found above.

## 2021-04-27 NOTE — ED CDU PROVIDER INITIAL DAY NOTE - OBJECTIVE STATEMENT
51 y/o male with a hx of HTN, hypothyroidism presents to the ER c/o 3-4 days of intermittent left sided chest pain.  Pain is left sided, intermittent, non exertional, non radiating, last a few seconds at a time.  Pt reports shortness of breath when chest pain occurs.  Pt reports intermittent left sided headache.  Pt denies fevers, chills, cough, abdominal pain, nausea, vomiting, back pain, head trauma, loc, weakness, dizziness, change in vision, calf pain, leg swelling.     CDU BETY Hernandez: Agree with above note. 51yo M non smoker with PMHX of HTN, hypothyroid here with c/p cp left sided, non exertional aw sob. IN ED labs unremarkable, trop x 1<6, dimer neg. EKG NSR non ischemic. cxr clear lungs. CDU for tele, stress, tele doc of day eval in am.

## 2021-04-27 NOTE — ED ADULT NURSE NOTE - OBJECTIVE STATEMENT
Pt received to intake A&Ox4. Pt C/O CP for 3-4 days. States pain is intermittent, but has gotten worse so decided to come to ED. Pt states pain is localized to L side. Pt states CP is associated with episodes of SOB and pain with inspirations. PMHx HTN. Pt resting comfortably at this time and denies current symptoms. 20G IV placed to LAC and labs drawn as ordered. Medicated per flowsheet. MD hernandez in progress.

## 2021-04-27 NOTE — ED CDU PROVIDER INITIAL DAY NOTE - DETAILS
49yo M non smoker with PMHX of HTN, hypothyroid here with c/p cp left sided, non exertional aw sob. IN ED labs unremarkable, trop x 1<6, dimer neg. EKG NSR non ischemic. cxr clear lungs. CDU for tele, stress, tele doc of day eval in am.

## 2021-04-27 NOTE — ED PROVIDER NOTE - CLINICAL SUMMARY MEDICAL DECISION MAKING FREE TEXT BOX
51 y/o male with a hx of HTN, hypothyroidism presents to the ER c/o 3-4 days of intermittent left sided chest pain.  Pain is left sided, intermittent, non exertional, non radiating, last a few seconds at a time.  Pt reports shortness of breath when chest pain occurs. Pt is well appearing, EKG NSR, will check labs, CXR, cardiac monitor, possible admit for cardiac workup.

## 2021-04-27 NOTE — ED PROVIDER NOTE - OBJECTIVE STATEMENT
49 y/o male with a hx of HTN, hypothyroidism presents to the ER c/o 3-4 days of intermittent left sided chest pain.  Pain is left sided, intermittent, non exertional, non radiating, last a few seconds at a time.  Pt reports shortness of breath when chest pain occurs.  Pt reports intermittent left sided headache.  Pt denies fevers, chills, cough, abdominal pain, nausea, vomiting, back pain, head trauma, loc, weakness, dizziness, change in vision, calf pain, leg swelling. H&P obtained via  ID# [id number].

## 2021-04-28 VITALS
RESPIRATION RATE: 16 BRPM | TEMPERATURE: 98 F | OXYGEN SATURATION: 100 % | SYSTOLIC BLOOD PRESSURE: 122 MMHG | HEART RATE: 75 BPM | DIASTOLIC BLOOD PRESSURE: 72 MMHG

## 2021-04-28 LAB
CHOLEST SERPL-MCNC: 191 MG/DL — SIGNIFICANT CHANGE UP
HDLC SERPL-MCNC: 36 MG/DL — LOW
LIPID PNL WITH DIRECT LDL SERPL: 128 MG/DL — HIGH
NON HDL CHOLESTEROL: 155 MG/DL — HIGH
SARS-COV-2 RNA SPEC QL NAA+PROBE: SIGNIFICANT CHANGE UP
TRIGL SERPL-MCNC: 134 MG/DL — SIGNIFICANT CHANGE UP

## 2021-04-28 PROCEDURE — 99217: CPT

## 2021-04-28 PROCEDURE — 93306 TTE W/DOPPLER COMPLETE: CPT | Mod: 26

## 2021-04-28 PROCEDURE — 93018 CV STRESS TEST I&R ONLY: CPT | Mod: GC

## 2021-04-28 PROCEDURE — 93016 CV STRESS TEST SUPVJ ONLY: CPT | Mod: GC

## 2021-04-28 PROCEDURE — 78452 HT MUSCLE IMAGE SPECT MULT: CPT | Mod: 26

## 2021-04-28 RX ADMIN — Medication 81 MILLIGRAM(S): at 11:26

## 2021-04-28 RX ADMIN — Medication 25 MICROGRAM(S): at 05:49

## 2021-04-28 NOTE — ED CDU PROVIDER SUBSEQUENT DAY NOTE - SKIN, MLM
patient says that [facility] sent a form for refill on testosterone cypionate (DEPO-TESTOSTERONE) 200 MG/ML injectable solution, and need it filled out before they will sent patient the medication    Skin normal color for race, warm, dry and intact. No evidence of rash.

## 2021-04-28 NOTE — ED ADULT NURSE REASSESSMENT NOTE - NS ED NURSE REASSESS COMMENT FT1
Pt resting comfortably at this time. Pt offering no complaints and remains in no acute distress. Respirations even and unlabored. Vitals per flowsheet.

## 2021-04-28 NOTE — ED CDU PROVIDER SUBSEQUENT DAY NOTE - ATTENDING CONTRIBUTION TO CARE
CDU MD ANDERSON:  I performed a face to face bedside interview with patient regarding history of present illness, review of symptoms and past medical history. I completed an independent physical exam.  I have discussed patient's plan of care with PA.   I agree with note as stated above, having amended the EMR as needed to reflect my findings. I have discussed the assessment and plan of care.  This includes during the time I functioned as the attending physician for this patient.

## 2021-04-28 NOTE — ED CDU PROVIDER DISPOSITION NOTE - CLINICAL COURSE
49yo M non smoker with PMHX of HTN, hypothyroid here with c/p cp left sided, non exertional aw sob. IN ED labs unremarkable, trop x 2<6, dimer neg. EKG NSR non ischemic. cxr clear lungs. No tele events in CDU. Stress showed no evidence of ischemia. Tele doc evaluated and cleared for dc with follow up as scheduled with them

## 2021-04-28 NOTE — ED ADULT NURSE REASSESSMENT NOTE - NS ED NURSE REASSESS COMMENT FT1
Report given to CDU RN. Pt resting comfortably in bed. Respirations even and unlabored sating 100% on room air. Pt remains NSR on cardiac monitor. Denies pain at this time.

## 2021-04-28 NOTE — ED CDU PROVIDER SUBSEQUENT DAY NOTE - MEDICAL DECISION MAKING DETAILS
51yo M non smoker with PMHX of HTN, hypothyroid here with c/p cp left sided, non exertional aw sob. IN ED labs unremarkable, trop x 1<6, dimer neg. EKG NSR non ischemic. cxr clear lungs. CDU for tele, stress, tele doc of day eval in am. 51yo M non smoker with PMHX of HTN, hypothyroid here with c/p cp left sided, non exertional aw sob. IN ED labs unremarkable, trop x 2<6, dimer neg. EKG NSR non ischemic. cxr clear lungs. CDU for tele, stress, tele doc of day eval in am.

## 2021-04-28 NOTE — CONSULT NOTE ADULT - SUBJECTIVE AND OBJECTIVE BOX
Requesting Physician : ER    Reason for Consultation: Chest pain     HISTORY OF PRESENT ILLNESS:  50 year old male with history of HTN, hypothyroidism, ENZO who is being seen for chest pain.  The patient reports left sided chest pain and dyspnea that started 5-6 days ago.  His symptoms occur with rest and occasionally when talking on the phone.  He denies exertional component to his symptoms.  He was admitted to the CDU where workup thus far includes negative cardiac enzymes and ECG demonstrating NST and tele with no significant arrhythmias  The patient was chest pain free upon evaluation.       PAST MEDICAL & SURGICAL HISTORY:  Hypertension    Hypothyroid    Seasonal allergies    Lipoma    Sleep apnea    No significant past surgical history            MEDICATIONS:  MEDICATIONS  (STANDING):  amLODIPine   Tablet 5 milliGRAM(s) Oral daily  aspirin enteric coated 81 milliGRAM(s) Oral daily  levothyroxine 25 MICROGram(s) Oral daily      Allergies    No Known Allergies    Intolerances        FAMILY HISTORY:  Family history of aortic stenosis (Father)    Family history of MI (myocardial infarction) (Mother)      Non-contributary for premature coronary disease or sudden cardiac death    SOCIAL HISTORY:    [x ] Non-smoker  [ ] Smoker  [ ] Alcohol      REVIEW OF SYSTEMS:  [x ]chest pain  [ x ]shortness of breath  [  ]palpitations  [  ]syncope  [ ]near syncope [ ]upper extremity weakness   [ ] lower extremity weakness  [  ]diplopia  [  ]altered mental status   [  ]fevers  [ ]chills [ ]nausea  [ ]vomitting  [  ]dysphagia    [ ]abdominal pain  [ ]melena  [ ]BRBPR    [  ]epistaxis  [  ]rash    [ ]lower extremity edema        [x ] All others negative	  [ ] Unable to obtain    PHYSICAL EXAM:  T(C): 36.4 (04-28-21 @ 05:46), Max: 37.1 (04-28-21 @ 03:15)  HR: 61 (04-28-21 @ 05:46) (58 - 96)  BP: 108/72 (04-28-21 @ 05:46) (101/61 - 136/92)  RR: 17 (04-28-21 @ 05:46) (17 - 18)  SpO2: 100% (04-28-21 @ 05:46) (97% - 100%)  Wt(kg): --  I&O's Summary        HEENT:   Normal oral mucosa, PERRL, EOMI	  Lymphatic: No lymphadenopathy , no edema  Cardiovascular: Normal S1 S2, No JVD, No murmurs , Peripheral pulses palpable 2+ bilaterally  Respiratory: Lungs clear to auscultation, normal effort 	  Gastrointestinal:  Soft, Non-tender, + BS	  Skin: No rashes, No ecchymoses, No cyanosis, warm to touch  Musculoskeletal: Normal range of motion, normal strength  Psychiatry:  Mood & affect appropriate      TELEMETRY: SR	    ECG: NSR  	  RADIOLOGY:  OTHER:     DIAGNOSTIC TESTING:  [ ] Echocardiogram: pending   [ ]  Catheterization:   [ ] Stress Test: pending    	  	  LABS:	 	    CARDIAC MARKERS:                              14.0   9.27  )-----------( 253      ( 27 Apr 2021 21:48 )             40.3     04-27    141  |  105  |  9   ----------------------------<  120<H>  3.6   |  25  |  0.68    Ca    8.9      27 Apr 2021 21:48    TPro  7.3  /  Alb  4.3  /  TBili  <0.2  /  DBili  x   /  AST  27  /  ALT  26  /  AlkPhos  58  04-27    proBNP:   Lipid Profile:   HgA1c:   TSH:     ASSESSMENT/PLAN:  50 year old male with history of HTN, hypothyroidism, ENZO who is being seen for chest pain.    -pt. chest pain free upon evaluation   -MI ruled out with no significant ECG changes  -follow up TTE  -follow up NST  -further workup pending above    Hussain Santiago MD

## 2021-04-28 NOTE — ED CDU PROVIDER DISPOSITION NOTE - NSFOLLOWUPINSTRUCTIONS_ED_ALL_ED_FT
Chest Pain    Chest pain can be caused by many different conditions which may or may not be dangerous. Causes include heartburn, lung infections, heart attack, blood clot in lungs, skin infections, strain or damage to muscle, cartilage, or bones, etc. In addition to a history and physical examination, an electrocardiogram (ECG) or other lab tests may have been performed to determine the cause of your chest pain. Follow up with your primary care provider or with a cardiologist as instructed.     SEEK IMMEDIATE MEDICAL CARE IF YOU HAVE ANY OF THE FOLLOWING SYMPTOMS: worsening chest pain, coughing up blood, unexplained back/neck/jaw pain, severe abdominal pain, dizziness or lightheadedness, fainting, shortness of breath, sweaty or clammy skin, vomiting, or racing heart beat. These symptoms may represent a serious problem that is an emergency. Do not wait to see if the symptoms will go away. Get medical help right away. Call 911 and do not drive yourself to the hospital.     Follow up with Cardiology as scheduled with the cardiologists today.

## 2021-04-28 NOTE — ED CDU PROVIDER DISPOSITION NOTE - PATIENT PORTAL LINK FT
You can access the FollowMyHealth Patient Portal offered by [facility] by registering at the following website: [email or web address]. By joining [employer]’s FollowMyHealth portal, you will also be able to view your health information using other applications (apps) compatible with our system.

## 2021-04-28 NOTE — ED CDU PROVIDER SUBSEQUENT DAY NOTE - PROGRESS NOTE DETAILS
Pt resting comfortably.  No complaints.  Denies cp or sob.  Pending stress echo tele doc eval. Received sign out from BETY Hernandez. Pt had no events over night, no complaints currently. Pending stress/echo results and tele doc eval. STress normal - tele doc evaluated. Cleared for dc with appt as scheduled with tele doc.

## 2021-04-28 NOTE — ED CDU PROVIDER DISPOSITION NOTE - CARE PROVIDER_API CALL
Hussain Santiago (MD)  Cardiovascular Disease; Internal Medicine; Interventional Cardiology  [address] [place] [address]  Phone: [phone]  Fax: [phone]  Follow Up Time:     Reji Marquez)  Cardiology  [address] [place] [address], [address]  [place] [address]  Phone: [phone]  Fax: [phone]  Follow Up Time:

## 2021-05-11 ENCOUNTER — APPOINTMENT (OUTPATIENT)
Dept: SURGERY | Facility: CLINIC | Age: 51
End: 2021-05-11

## 2021-08-04 ENCOUNTER — EMERGENCY (EMERGENCY)
Facility: HOSPITAL | Age: 51
LOS: 1 days | Discharge: HOME | End: 2021-08-04
Attending: EMERGENCY MEDICINE | Admitting: EMERGENCY MEDICINE
Payer: MEDICAID

## 2021-08-04 VITALS
HEART RATE: 56 BPM | OXYGEN SATURATION: 100 % | SYSTOLIC BLOOD PRESSURE: 116 MMHG | DIASTOLIC BLOOD PRESSURE: 70 MMHG | TEMPERATURE: 97 F | RESPIRATION RATE: 18 BRPM | HEIGHT: 63 IN

## 2021-08-04 LAB
ALBUMIN SERPL ELPH-MCNC: 4.4 G/DL — SIGNIFICANT CHANGE UP (ref 3.3–5)
ALP SERPL-CCNC: 60 U/L — SIGNIFICANT CHANGE UP (ref 40–120)
ALT FLD-CCNC: 21 U/L — SIGNIFICANT CHANGE UP (ref 4–41)
ANION GAP SERPL CALC-SCNC: 13 MMOL/L — SIGNIFICANT CHANGE UP (ref 7–14)
AST SERPL-CCNC: 17 U/L — SIGNIFICANT CHANGE UP (ref 4–40)
BILIRUB SERPL-MCNC: 0.3 MG/DL — SIGNIFICANT CHANGE UP (ref 0.2–1.2)
BUN SERPL-MCNC: 11 MG/DL — SIGNIFICANT CHANGE UP (ref 7–23)
CALCIUM SERPL-MCNC: 9 MG/DL — SIGNIFICANT CHANGE UP (ref 8.4–10.5)
CHLORIDE SERPL-SCNC: 103 MMOL/L — SIGNIFICANT CHANGE UP (ref 98–107)
CO2 SERPL-SCNC: 25 MMOL/L — SIGNIFICANT CHANGE UP (ref 22–31)
CREAT SERPL-MCNC: 0.75 MG/DL — SIGNIFICANT CHANGE UP (ref 0.5–1.3)
GLUCOSE SERPL-MCNC: 100 MG/DL — HIGH (ref 70–99)
HCT VFR BLD CALC: 42.4 % — SIGNIFICANT CHANGE UP (ref 39–50)
HGB BLD-MCNC: 13.7 G/DL — SIGNIFICANT CHANGE UP (ref 13–17)
MCHC RBC-ENTMCNC: 29.1 PG — SIGNIFICANT CHANGE UP (ref 27–34)
MCHC RBC-ENTMCNC: 32.3 GM/DL — SIGNIFICANT CHANGE UP (ref 32–36)
MCV RBC AUTO: 90 FL — SIGNIFICANT CHANGE UP (ref 80–100)
NRBC # BLD: 0 /100 WBCS — SIGNIFICANT CHANGE UP
NRBC # FLD: 0 K/UL — SIGNIFICANT CHANGE UP
NT-PROBNP SERPL-SCNC: <5 PG/ML — SIGNIFICANT CHANGE UP
PLATELET # BLD AUTO: 284 K/UL — SIGNIFICANT CHANGE UP (ref 150–400)
POTASSIUM SERPL-MCNC: 3.9 MMOL/L — SIGNIFICANT CHANGE UP (ref 3.5–5.3)
POTASSIUM SERPL-SCNC: 3.9 MMOL/L — SIGNIFICANT CHANGE UP (ref 3.5–5.3)
PROT SERPL-MCNC: 7 G/DL — SIGNIFICANT CHANGE UP (ref 6–8.3)
RBC # BLD: 4.71 M/UL — SIGNIFICANT CHANGE UP (ref 4.2–5.8)
RBC # FLD: 12.8 % — SIGNIFICANT CHANGE UP (ref 10.3–14.5)
SODIUM SERPL-SCNC: 141 MMOL/L — SIGNIFICANT CHANGE UP (ref 135–145)
TROPONIN T, HIGH SENSITIVITY RESULT: <6 NG/L — SIGNIFICANT CHANGE UP
WBC # BLD: 9.4 K/UL — SIGNIFICANT CHANGE UP (ref 3.8–10.5)
WBC # FLD AUTO: 9.4 K/UL — SIGNIFICANT CHANGE UP (ref 3.8–10.5)

## 2021-08-04 PROCEDURE — 99285 EMERGENCY DEPT VISIT HI MDM: CPT | Mod: 25

## 2021-08-04 PROCEDURE — 93010 ELECTROCARDIOGRAM REPORT: CPT

## 2021-08-04 PROCEDURE — 71046 X-RAY EXAM CHEST 2 VIEWS: CPT | Mod: 26

## 2021-08-04 NOTE — ED ADULT NURSE NOTE - OBJECTIVE STATEMENT
Pt received to intake presents with "slow heart rate". pt reports he checked his heart rate after waking up and it was in the "low 50s". Pt currently NSr on CM, 18G IV placed in left arm, labs drawn and sent, will continue to monitor. pt denies CP, SOB.

## 2021-08-04 NOTE — ED PROVIDER NOTE - OBJECTIVE STATEMENT
51 yr old male c/o feeling his heart rate being slow, checked on O2 sat meter which showed HR of 52-58. Has a hx of ENZO, HTN on amlodipine 5 mg,  Does describe intermittent SOB at times, not related to exercise, no fever , chills, Denies CP, N/V weakness or lightheadedness.  Stated he didn't sleep well, not sure why. Works as a , denies calf tenderness or swelling.  HTN, no DM family hx, smoking, cholesterol

## 2021-08-04 NOTE — ED PROVIDER NOTE - NSFOLLOWUPINSTRUCTIONS_ED_ALL_ED_FT
Follow up with your PMD for adjustment of BP medications   Continue amlodipine , take at bedtime  See PMD or endocrinologist for adjustment of thyroid medicine  Return for shortness of breath, Chest pain, Shortness of breath or new or concerning symptoms.  Advance activity as tolerated.  Continue all previously prescribed medications as directed unless otherwise instructed.  Follow up with your primary care physician in 48-72 hours- bring copies of your results.  Return to the ER for worsening or persistent symptoms, and/or ANY NEW OR CONCERNING SYMPTOMS. If you have issues obtaining follow up, please call: [phone] to obtain a doctor or specialist who takes your insurance in your area.  You may call [phone] to make an appointment with the internal medicine clinic.

## 2021-08-04 NOTE — ED ADULT NURSE NOTE - NSIMPLEMENTINTERV_GEN_ALL_ED
Implemented All Universal Safety Interventions:  Colp to call system. Call bell, personal items and telephone within reach. Instruct patient to call for assistance. Room bathroom lighting operational. Non-slip footwear when patient is off stretcher. Physically safe environment: no spills, clutter or unnecessary equipment. Stretcher in lowest position, wheels locked, appropriate side rails in place.

## 2021-08-04 NOTE — ED PROVIDER NOTE - PATIENT PORTAL LINK FT
You can access the FollowMyHealth Patient Portal offered by [facility] by registering at the following website: [email or web address]. By joining [employer]’s FollowMyHealth portal, you will also be able to view your health information using other applications (apps) compatible with our system.

## 2021-08-04 NOTE — ED PROVIDER NOTE - CLINICAL SUMMARY MEDICAL DECISION MAKING FREE TEXT BOX
51 yr old male c/o slow heart rate without symptoms on amlodipine, no change in dose, also occasional SOB, not at present. Will check labs ecg CXR, monitor while here for slow heart rate.

## 2021-08-11 NOTE — ED POST DISCHARGE NOTE - REASON FOR FOLLOW-UP
Patient's daughter called asking that we fax over patient's results to DR Daljit anthony fax # [phone] faxed. Other

## 2022-02-04 NOTE — ASU PREOP CHECKLIST - TAMPON REMOVED
Hx of alcohol abuse, 6 drinks daily after work with co-workers. Now drinks 1-2 beers/night. CIWA and PRN ativan discontinued after out of withdrawal window based on reported last drink 1/30.    - Addiction Psychiatry consulted, appreciate recommendations    - patient declined intervention or therapy at this time   - outpatient Psych referral  - encouraged cessation  - folate, thiamine, MVI   n/a

## 2022-07-06 NOTE — ED ADULT NURSE NOTE - NS ED NURSE RECORD ANOTHER HT AND WT
[facility] CARE COORDINATION  [facility] [address] [place] [address]    July 6, 2022    Tushar Mancia  [address]  [facility] [address]      Dear Tushar,    I am a clinic care coordinator who works with Physician Ashli Ref-Primary with the [facility]. I have been trying to reach you recently to introduce Clinic Care Coordination. I recently tried to call and was unable to reach you. Below is a description of clinic care coordination and how I can further assist you.       The clinic care coordination team is made up of a registered nurse, , financial resource worker and community health worker who understand the health care system. The goal of clinic care coordination is to help you manage your health and improve access to the health care system. Our team works alongside your provider to assist you in determining your health and social needs. We can help you obtain health care and community resources, providing you with necessary information and education. We can work with you through any barriers and develop a care plan that helps coordinate and strengthen the communication between you and your care team.    Please feel free to contact me with any questions or concerns regarding care coordination and what we can offer.      We are focused on providing you with the highest-quality healthcare experience possible.    Sincerely,     Estefani Glynn RN        Yes

## 2022-07-20 NOTE — ED ADULT NURSE NOTE - TEMPLATE
General FREE:[LAST:[Please make sure to follow up with your primary care doctor in 3 days],PHONE:[(   )    -],FAX:[(   )    -]]

## 2022-08-25 NOTE — ASU PATIENT PROFILE, ADULT - AS SC BRADEN NUTRITION
How Severe Are Your Spot(S)?: mild What Type Of Note Output Would You Prefer (Optional)?: Bullet Format What Is The Reason For Today's Visit?: Full Body Skin Examination What Is The Reason For Today's Visit? (Being Monitored For X): concerning skin lesions on an annual basis (4) excellent

## 2022-11-03 NOTE — ED CDU PROVIDER INITIAL DAY NOTE - CPE EDP SKIN NORM
11/17/22    The pt did agree to be schedule for an appointment with Palliative Care Team Regla Lopez on 12/21/22 11 am.  Thank you.      11/14/22    Spoke to Mrs. Gray chemo session c/b at a later time.  Unable, to schedule an appointment at this time.   Thank you.        11/4/2022    Spoke to Ms. Gray Haskins, states at this time she's not able to make an naresh. C/B next week. Thank you.      11/3/22    VM, left for the pt to call back Palliative Care office and schedule an appointment.  Thank you.   normal...

## 2022-12-15 ENCOUNTER — APPOINTMENT (OUTPATIENT)
Dept: RHEUMATOLOGY | Facility: CLINIC | Age: 52
End: 2022-12-15

## 2022-12-15 VITALS
SYSTOLIC BLOOD PRESSURE: 118 MMHG | OXYGEN SATURATION: 98 % | DIASTOLIC BLOOD PRESSURE: 84 MMHG | HEART RATE: 82 BPM | RESPIRATION RATE: 17 BRPM | TEMPERATURE: 97.9 F | HEIGHT: 63 IN

## 2022-12-15 DIAGNOSIS — Z86.39 PERSONAL HISTORY OF OTHER ENDOCRINE, NUTRITIONAL AND METABOLIC DISEASE: ICD-10-CM

## 2022-12-15 DIAGNOSIS — G47.30 SLEEP APNEA, UNSPECIFIED: ICD-10-CM

## 2022-12-15 DIAGNOSIS — Z82.61 FAMILY HISTORY OF ARTHRITIS: ICD-10-CM

## 2022-12-15 PROCEDURE — 99205 OFFICE O/P NEW HI 60 MIN: CPT | Mod: 25

## 2022-12-15 RX ORDER — PREDNISONE 10 MG/1
10 TABLET ORAL
Qty: 7 | Refills: 0 | Status: ACTIVE | COMMUNITY
Start: 2022-12-15 | End: 1900-01-01

## 2022-12-15 NOTE — REASON FOR VISIT
[Initial Evaluation] : an initial evaluation [Patient Declined  Services] : - None: Patient declined  services

## 2022-12-15 NOTE — ASSESSMENT
[FreeTextEntry1] : 52 year old male presents with reported history of rheumatoid arthritis.  His current presentation is not suggestive of an inflammatory arthritis, though it's possible that this is due to persistent effect from methotrexate which he had been taking until about 2 weeks ago.  I have therefore ordered some more bloodwork and x-rays as further workup.  In the meantime, I have prescribed prednisone 10mg daily x 1 week.  He will follow up with me again in 2 weeks to review his results and to evaluate his response to prednisone.\par Review of his prior records reveals a positive dsDNA, though he does not exhibit any obvious signs/symptoms of SLE at this time.

## 2022-12-15 NOTE — HISTORY OF PRESENT ILLNESS
[Arthralgias] : arthralgias [FreeTextEntry1] : 52 year old male with PMHx as listed below reports that he has been experiencing joint pains, including his hands, wrists, and ankles for the past year.  The pain is episodic, occurring several times per week, usually lasting about 2 minutes at a time.  He describes the pain as burning, 7 out of 10.  (+)occasional swelling in the hands. (+)AM stiffness, usually lasting several minutes.  He saw another rheumatologist, who diagnosed him with RA and started him on methotrexate.  The pain improved (though did not resolve), but he didn't tolerate it.  He was then switched to SC MTX, but still didn't tolerate it so it was D/C'ed about 2 weeks ago.  He gets some relief from Tylenol.  No other known alleviating factors.\par No F/C, no unintentional weight loss, no night sweats, no oral ulcers, no rashes, no alopecia, no photosensitivity, no dry eyes/dry mouth, no Raynaud symptoms, no focal weakness, no dysphagia  [Anorexia] : no anorexia [Weight Loss] : no weight loss [Malaise] : no malaise [Fever] : no fever [Chills] : no chills [Fatigue] : no fatigue [Malar Facial Rash] : no malar facial rash [Skin Lesions] : no lesions [Skin Nodules] : no skin nodules [Oral Ulcers] : no oral ulcers [Cough] : no cough [Dry Mouth] : no dry mouth [Dysphonia] : no dysphonia [Dysphagia] : no dysphagia [Shortness of Breath] : no shortness of breath [Chest Pain] : no chest pain [Joint Swelling] : no joint swelling [Joint Warmth] : no joint warmth [Joint Deformity] : no joint deformity [Decreased ROM] : no decreased range of motion [Morning Stiffness] : no morning stiffness [Falls] : no falls [Difficulty Standing] : no difficulty standing [Difficulty Walking] : no difficulty walking [Dyspnea] : no dyspnea [Myalgias] : no myalgias [Muscle Weakness] : no muscle weakness [Muscle Spasms] : no muscle spasms [Muscle Cramping] : no muscle cramping [Visual Changes] : no visual changes [Eye Pain] : no eye pain [Eye Redness] : no eye redness [Dry Eyes] : no dry eyes

## 2022-12-15 NOTE — DATA REVIEWED
[FreeTextEntry1] : 4/26/2022:\par CBC, CMP unremarkable\par \par aldolase 9.0\par TSH 2.075\par RF, CCP - negative\par JOE negative\par (+)dsDNA\par SSA/SSB, Sm, RNP, centromere AB, SCL-70 - negative, \par ANCA. MPO, IL-3 - negative\par C3/C4 WNL\par HLA-B27 negative\par Lyme negative\par Quanitferon negative

## 2022-12-15 NOTE — CONSULT LETTER
[Dear  ___] : Dear  [unfilled], [Consult Letter:] : I had the pleasure of evaluating your patient, [unfilled]. [Please see my note below.] : Please see my note below. [Consult Closing:] : Thank you very much for allowing me to participate in the care of this patient.  If you have any questions, please do not hesitate to contact me. [Sincerely,] : Sincerely, [FreeTextEntry3] : Kasi Trevizo MD\par Rheumatology\par [facility]\par  of Medicine\par [facility] \par \par [address] [facility] [address]\par \par [address]\[place] [address]\par \par [address] [address]\par \par phone:  [phone]\par fax:      [phone]\par

## 2022-12-15 NOTE — PHYSICAL EXAM
[General Appearance - Alert] : alert [General Appearance - In No Acute Distress] : in no acute distress [Sclera] : the sclera and conjunctiva were normal [Outer Ear] : the ears and nose were normal in appearance [Oropharynx] : the oropharynx was normal [Neck Appearance] : the appearance of the neck was normal [Neck Cervical Mass (___cm)] : no neck mass was observed [Jugular Venous Distention Increased] : there was no jugular-venous distention [Thyroid Diffuse Enlargement] : the thyroid was not enlarged [Thyroid Nodule] : there were no palpable thyroid nodules [Auscultation Breath Sounds / Voice Sounds] : lungs were clear to auscultation bilaterally [Heart Rate And Rhythm] : heart rate was normal and rhythm regular [Heart Sounds] : normal S1 and S2 [Heart Sounds Gallop] : no gallops [Murmurs] : no murmurs [Heart Sounds Pericardial Friction Rub] : no pericardial rub [Edema] : there was no peripheral edema [Bowel Sounds] : normal bowel sounds [Abdomen Soft] : soft [Abdomen Tenderness] : non-tender [Abdomen Mass (___ Cm)] : no abdominal mass palpated [Cervical Lymph Nodes Enlarged Posterior Bilaterally] : posterior cervical [Cervical Lymph Nodes Enlarged Anterior Bilaterally] : anterior cervical [Supraclavicular Lymph Nodes Enlarged Bilaterally] : supraclavicular [Skin Color & Pigmentation] : normal skin color and pigmentation [Skin Turgor] : normal skin turgor [] : no rash [No Focal Deficits] : no focal deficits [Oriented To Time, Place, And Person] : oriented to person, place, and time [Impaired Insight] : insight and judgment were intact [Affect] : the affect was normal [FreeTextEntry1] : No synovitis;  normal ROM in all joints

## 2022-12-16 LAB
ALBUMIN SERPL ELPH-MCNC: 4.7 G/DL
ALP BLD-CCNC: 68 U/L
ALT SERPL-CCNC: 25 U/L
ANION GAP SERPL CALC-SCNC: 15 MMOL/L
AST SERPL-CCNC: 20 U/L
BASOPHILS # BLD AUTO: 0.08 K/UL
BASOPHILS NFR BLD AUTO: 0.8 %
BILIRUB SERPL-MCNC: 0.3 MG/DL
BUN SERPL-MCNC: 11 MG/DL
CALCIUM SERPL-MCNC: 10.1 MG/DL
CHLORIDE SERPL-SCNC: 101 MMOL/L
CO2 SERPL-SCNC: 23 MMOL/L
CREAT SERPL-MCNC: 0.78 MG/DL
CRP SERPL-MCNC: 4 MG/L
EGFR: 107 ML/MIN/1.73M2
EOSINOPHIL # BLD AUTO: 0.49 K/UL
EOSINOPHIL NFR BLD AUTO: 5.2 %
ERYTHROCYTE [SEDIMENTATION RATE] IN BLOOD BY WESTERGREN METHOD: 16 MM/HR
GLUCOSE SERPL-MCNC: 119 MG/DL
HCT VFR BLD CALC: 44.4 %
HGB BLD-MCNC: 14.6 G/DL
IMM GRANULOCYTES NFR BLD AUTO: 0.3 %
LYMPHOCYTES # BLD AUTO: 3.59 K/UL
LYMPHOCYTES NFR BLD AUTO: 37.9 %
MAN DIFF?: NORMAL
MCHC RBC-ENTMCNC: 30.9 PG
MCHC RBC-ENTMCNC: 32.9 GM/DL
MCV RBC AUTO: 93.9 FL
MONOCYTES # BLD AUTO: 0.63 K/UL
MONOCYTES NFR BLD AUTO: 6.7 %
NEUTROPHILS # BLD AUTO: 4.64 K/UL
NEUTROPHILS NFR BLD AUTO: 49.1 %
PLATELET # BLD AUTO: 325 K/UL
POTASSIUM SERPL-SCNC: 4.2 MMOL/L
PROT SERPL-MCNC: 7 G/DL
RBC # BLD: 4.73 M/UL
RBC # FLD: 13.2 %
RHEUMATOID FACT SER QL: <10 IU/ML
SODIUM SERPL-SCNC: 139 MMOL/L
WBC # FLD AUTO: 9.46 K/UL

## 2022-12-17 ENCOUNTER — OUTPATIENT (OUTPATIENT)
Dept: OUTPATIENT SERVICES | Facility: HOSPITAL | Age: 52
LOS: 1 days | End: 2022-12-17
Payer: MEDICAID

## 2022-12-17 ENCOUNTER — APPOINTMENT (OUTPATIENT)
Dept: RADIOLOGY | Facility: IMAGING CENTER | Age: 52
End: 2022-12-17

## 2022-12-17 DIAGNOSIS — Z01.818 ENCOUNTER FOR OTHER PREPROCEDURAL EXAMINATION: ICD-10-CM

## 2022-12-17 PROCEDURE — 73620 X-RAY EXAM OF FOOT: CPT | Mod: 26,50

## 2022-12-17 PROCEDURE — 73130 X-RAY EXAM OF HAND: CPT

## 2022-12-17 PROCEDURE — 73130 X-RAY EXAM OF HAND: CPT | Mod: 26,50

## 2022-12-17 PROCEDURE — 73620 X-RAY EXAM OF FOOT: CPT

## 2022-12-19 LAB
ANA SER IF-ACNC: NEGATIVE
CCP AB SER IA-ACNC: <8 UNITS
RF+CCP IGG SER-IMP: NEGATIVE

## 2022-12-30 ENCOUNTER — APPOINTMENT (OUTPATIENT)
Dept: RHEUMATOLOGY | Facility: CLINIC | Age: 52
End: 2022-12-30
Payer: MEDICAID

## 2022-12-30 VITALS
BODY MASS INDEX: 33.65 KG/M2 | HEIGHT: 63 IN | WEIGHT: 189.9 LBS | TEMPERATURE: 97.6 F | OXYGEN SATURATION: 98 % | SYSTOLIC BLOOD PRESSURE: 121 MMHG | HEART RATE: 73 BPM | DIASTOLIC BLOOD PRESSURE: 76 MMHG

## 2022-12-30 PROCEDURE — 99214 OFFICE O/P EST MOD 30 MIN: CPT

## 2022-12-30 NOTE — ASSESSMENT
[FreeTextEntry1] : 52 year old male with polyarticular arthritis.  Reportedly was previously dx'ed w/ RA and stared on MTX, though no current si/sx of RA.  Current presentation more c/w OA and/or overuse syndrome.  Review of his prior records also revealed a positive dsDNA, though he does not exhibit any obvious signs/symptoms of SLE at this time.  \par   - Reiterated importance of exercise\par   - ibuprofen and/or Tylenol prn\par   - heating pads or ice packs\par   - OTC topical analgesics\par

## 2022-12-30 NOTE — PHYSICAL EXAM
[General Appearance - Alert] : alert [General Appearance - In No Acute Distress] : in no acute distress [Sclera] : the sclera and conjunctiva were normal [Outer Ear] : the ears and nose were normal in appearance [Oropharynx] : the oropharynx was normal [Neck Appearance] : the appearance of the neck was normal [Neck Cervical Mass (___cm)] : no neck mass was observed [Jugular Venous Distention Increased] : there was no jugular-venous distention [Thyroid Diffuse Enlargement] : the thyroid was not enlarged [Thyroid Nodule] : there were no palpable thyroid nodules [Auscultation Breath Sounds / Voice Sounds] : lungs were clear to auscultation bilaterally [Heart Rate And Rhythm] : heart rate was normal and rhythm regular [Heart Sounds Gallop] : no gallops [Heart Sounds] : normal S1 and S2 [Murmurs] : no murmurs [Heart Sounds Pericardial Friction Rub] : no pericardial rub [Edema] : there was no peripheral edema [Bowel Sounds] : normal bowel sounds [Abdomen Soft] : soft [Abdomen Tenderness] : non-tender [Abdomen Mass (___ Cm)] : no abdominal mass palpated [Cervical Lymph Nodes Enlarged Posterior Bilaterally] : posterior cervical [Cervical Lymph Nodes Enlarged Anterior Bilaterally] : anterior cervical [Supraclavicular Lymph Nodes Enlarged Bilaterally] : supraclavicular [Skin Color & Pigmentation] : normal skin color and pigmentation [Skin Turgor] : normal skin turgor [] : no rash [No Focal Deficits] : no focal deficits [Oriented To Time, Place, And Person] : oriented to person, place, and time [Impaired Insight] : insight and judgment were intact [Affect] : the affect was normal [FreeTextEntry1] : No synovitis;  normal ROM in all joints

## 2022-12-30 NOTE — HISTORY OF PRESENT ILLNESS
[Arthralgias] : arthralgias [FreeTextEntry1] : Had been feeling fine until yesterday, when he began to experience pain in his hands.  No joint swelling.  No AM stiffness.  He took prednisone for 1 week and did not feel any different.   [Anorexia] : no anorexia [Weight Loss] : no weight loss [Malaise] : no malaise [Fever] : no fever [Chills] : no chills [Fatigue] : no fatigue [Malar Facial Rash] : no malar facial rash [Skin Lesions] : no lesions [Skin Nodules] : no skin nodules [Oral Ulcers] : no oral ulcers [Cough] : no cough [Dry Mouth] : no dry mouth [Dysphonia] : no dysphonia [Dysphagia] : no dysphagia [Shortness of Breath] : no shortness of breath [Chest Pain] : no chest pain [Joint Swelling] : no joint swelling [Joint Warmth] : no joint warmth [Joint Deformity] : no joint deformity [Decreased ROM] : no decreased range of motion [Morning Stiffness] : no morning stiffness [Falls] : no falls [Difficulty Standing] : no difficulty standing [Difficulty Walking] : no difficulty walking [Dyspnea] : no dyspnea [Myalgias] : no myalgias [Muscle Weakness] : no muscle weakness [Muscle Spasms] : no muscle spasms [Muscle Cramping] : no muscle cramping [Visual Changes] : no visual changes [Eye Pain] : no eye pain [Eye Redness] : no eye redness [Dry Eyes] : no dry eyes

## 2023-03-22 ENCOUNTER — APPOINTMENT (OUTPATIENT)
Dept: RHEUMATOLOGY | Facility: CLINIC | Age: 53
End: 2023-03-22
Payer: MEDICAID

## 2023-03-22 VITALS
WEIGHT: 190 LBS | BODY MASS INDEX: 33.66 KG/M2 | OXYGEN SATURATION: 96 % | SYSTOLIC BLOOD PRESSURE: 118 MMHG | HEIGHT: 63 IN | DIASTOLIC BLOOD PRESSURE: 81 MMHG | HEART RATE: 81 BPM

## 2023-03-22 DIAGNOSIS — M79.642 PAIN IN RIGHT HAND: ICD-10-CM

## 2023-03-22 DIAGNOSIS — M15.9 POLYOSTEOARTHRITIS, UNSPECIFIED: ICD-10-CM

## 2023-03-22 DIAGNOSIS — M79.89 OTHER SPECIFIED SOFT TISSUE DISORDERS: ICD-10-CM

## 2023-03-22 DIAGNOSIS — M25.571 PAIN IN RIGHT ANKLE AND JOINTS OF RIGHT FOOT: ICD-10-CM

## 2023-03-22 DIAGNOSIS — M13.0 POLYARTHRITIS, UNSPECIFIED: ICD-10-CM

## 2023-03-22 DIAGNOSIS — M25.572 PAIN IN RIGHT ANKLE AND JOINTS OF RIGHT FOOT: ICD-10-CM

## 2023-03-22 DIAGNOSIS — M79.641 PAIN IN RIGHT HAND: ICD-10-CM

## 2023-03-22 DIAGNOSIS — R76.8 OTHER SPECIFIED ABNORMAL IMMUNOLOGICAL FINDINGS IN SERUM: ICD-10-CM

## 2023-03-22 DIAGNOSIS — M19.90 UNSPECIFIED OSTEOARTHRITIS, UNSPECIFIED SITE: ICD-10-CM

## 2023-03-22 PROCEDURE — 99214 OFFICE O/P EST MOD 30 MIN: CPT

## 2023-03-22 NOTE — PHYSICAL EXAM
[General Appearance - Alert] : alert [General Appearance - In No Acute Distress] : in no acute distress [Sclera] : the sclera and conjunctiva were normal [Outer Ear] : the ears and nose were normal in appearance [Oropharynx] : the oropharynx was normal [Neck Appearance] : the appearance of the neck was normal [Neck Cervical Mass (___cm)] : no neck mass was observed [Jugular Venous Distention Increased] : there was no jugular-venous distention [Thyroid Diffuse Enlargement] : the thyroid was not enlarged [Thyroid Nodule] : there were no palpable thyroid nodules [Auscultation Breath Sounds / Voice Sounds] : lungs were clear to auscultation bilaterally [Heart Sounds] : normal S1 and S2 [Heart Rate And Rhythm] : heart rate was normal and rhythm regular [Heart Sounds Gallop] : no gallops [Murmurs] : no murmurs [Heart Sounds Pericardial Friction Rub] : no pericardial rub [Edema] : there was no peripheral edema [Bowel Sounds] : normal bowel sounds [Abdomen Soft] : soft [Abdomen Tenderness] : non-tender [Abdomen Mass (___ Cm)] : no abdominal mass palpated [Cervical Lymph Nodes Enlarged Posterior Bilaterally] : posterior cervical [Cervical Lymph Nodes Enlarged Anterior Bilaterally] : anterior cervical [Supraclavicular Lymph Nodes Enlarged Bilaterally] : supraclavicular [FreeTextEntry1] : No synovitis;  normal ROM in all joints [Skin Color & Pigmentation] : normal skin color and pigmentation [Skin Turgor] : normal skin turgor [] : no rash [No Focal Deficits] : no focal deficits [Oriented To Time, Place, And Person] : oriented to person, place, and time [Impaired Insight] : insight and judgment were intact [Affect] : the affect was normal

## 2023-05-17 NOTE — ED PROVIDER NOTE - CONSTITUTIONAL MANNER
[Time Spent: ___ minutes] : I have spent [unfilled] minutes of time on the encounter.
appropriate for situation

## 2023-08-04 ENCOUNTER — APPOINTMENT (OUTPATIENT)
Dept: RHEUMATOLOGY | Facility: CLINIC | Age: 53
End: 2023-08-04

## 2023-11-24 NOTE — ED ADULT NURSE NOTE - SUICIDE SCREENING QUESTION 2
Called patient to inform it will be addressed at next visit. Patient verbalized understanding.
Refill request  Last seen Visit date not found  Next appt 11/28/2023  [facility]
No

## 2024-09-25 NOTE — END OF VISIT
[Time Spent: ___ minutes] : I have spent [unfilled] minutes of time on the encounter. (2) more than 100 beats/min

## 2024-09-30 NOTE — DISCHARGE NOTE ADULT - ADDITIONAL INSTRUCTIONS
Anesthesia Post-op Note    Patient: Ezequiel Solis  Procedure(s) Performed: EGD with APC, biopsies  IMAGING PROCEDURE, GI TRACT, INTRALUMINAL, VIA CAPSULE  Anesthesia type: MAC    Vitals Value Taken Time   Temp 98 09/30/24 1213   Pulse 94 09/30/24 1213   Resp 17 09/30/24 1213   SpO2 98 09/30/24 1213   /92 09/30/24 1213         Patient Location: Phase II  Post-op Vital Signs:stable  Level of Consciousness: awake, alert, follows commands, responds to stimulation and participates in exam  Respiratory Status: spontaneous ventilation, unassisted and room air  Cardiovascular stable and blood pressure returned to baseline  Hydration: euvolemic  Pain Management: well controlled  Handoff: Handoff to receiving clinician was performed and questions were answered  Vomiting: none  Nausea: None  Airway Patency:patent  Post-op Assessment: awake, alert, appropriately conversant, or baseline, no complications, patient tolerated procedure well, dentition, mouth, tongue, and oropharynx unchanged , moving all extremities and No Corneal Abrasion  Comments: Handoff report given to RN. Questions answered. VSS.         No notable events documented.                       Follow up with cardiologist within one week of discharge. Call for appointment. Return to ED for any concerning symptoms. Continue medications as prescribed. Low salt, low fat, low cholesterol diet. You had a cardiac cath therefore no heavy lifting greater than 5-10 pounds with right wrist x one week. No strenuous activity x 3 weeks. Monitor site of procedure and notify your doctor for any redness, swelling, discharge.

## 2025-04-21 NOTE — ED CDU PROVIDER SUBSEQUENT DAY NOTE - FAMILY HISTORY
Detail Level: Detailed Biopsy Photograph Reviewed: Yes Accession # (Optional): [id number] Number Of Curettages: 3 Size Of Lesion In Cm: 0.8 Size Of Lesion After Curettage: 1.3 Add Intralesional Injection: No Total Volume (Ccs): 1 Anesthesia Type: 1% lidocaine with epinephrine and a 1:10 solution of 8.4% sodium bicarbonate Cautery Type: electrodesiccation What Was Performed First?: Curettage Final Size Statement: The size of the lesion after curettage was Additional Information: (Optional): The wound was cleaned, and a pressure dressing was applied.  The patient received detailed post-op instructions. Consent was obtained from the patient. The risks, benefits and alternatives to therapy were discussed in detail. Specifically, the risks of infection, scarring, bleeding, prolonged wound healing, nerve injury, incomplete removal, allergy to anesthesia and recurrence were addressed. Alternatives to ED&C, such as: surgical removal and XRT were also discussed.  Prior to the procedure, the treatment site was clearly identified and confirmed by the patient. All components of Universal Protocol/PAUSE Rule completed. Post-Care Instructions: I reviewed with the patient in detail post-care instructions. Patient is to keep the area dry for 48 hours, and not to engage in any swimming until the area is healed. Should the patient develop any fevers, chills, bleeding, severe pain patient will contact the office immediately. Bill As A Line Item Or As Units: Line Item Father  Still living? Unknown  Family history of aortic stenosis, Age at diagnosis: Age Unknown     Mother  Still living? Unknown  Family history of MI (myocardial infarction), Age at diagnosis: Age Unknown